# Patient Record
Sex: FEMALE | Race: WHITE | Employment: FULL TIME | URBAN - METROPOLITAN AREA
[De-identification: names, ages, dates, MRNs, and addresses within clinical notes are randomized per-mention and may not be internally consistent; named-entity substitution may affect disease eponyms.]

---

## 2017-03-16 PROBLEM — N39.0 RECURRENT UTI: Status: ACTIVE | Noted: 2017-03-16

## 2017-03-16 PROCEDURE — 81001 URINALYSIS AUTO W/SCOPE: CPT | Performed by: UROLOGY

## 2017-05-31 ENCOUNTER — OFFICE VISIT (OUTPATIENT)
Dept: FAMILY MEDICINE CLINIC | Facility: CLINIC | Age: 22
End: 2017-05-31

## 2017-05-31 VITALS
TEMPERATURE: 99 F | RESPIRATION RATE: 16 BRPM | HEART RATE: 77 BPM | BODY MASS INDEX: 20.03 KG/M2 | OXYGEN SATURATION: 97 % | WEIGHT: 132.19 LBS | HEIGHT: 68 IN

## 2017-05-31 DIAGNOSIS — H65.93 OTITIS MEDIA WITH EFFUSION, BILATERAL: ICD-10-CM

## 2017-05-31 DIAGNOSIS — H69.83 EUSTACHIAN TUBE DYSFUNCTION, BILATERAL: ICD-10-CM

## 2017-05-31 DIAGNOSIS — H66.92 LEFT ACUTE OTITIS MEDIA: Primary | ICD-10-CM

## 2017-05-31 PROCEDURE — 99213 OFFICE O/P EST LOW 20 MIN: CPT | Performed by: NURSE PRACTITIONER

## 2017-05-31 RX ORDER — AZITHROMYCIN 250 MG/1
TABLET, FILM COATED ORAL
Qty: 6 TABLET | Refills: 0 | Status: SHIPPED | OUTPATIENT
Start: 2017-05-31 | End: 2017-07-18 | Stop reason: ALTCHOICE

## 2017-05-31 RX ORDER — FLUTICASONE PROPIONATE 50 MCG
1 SPRAY, SUSPENSION (ML) NASAL 2 TIMES DAILY
Qty: 1 BOTTLE | Refills: 1 | Status: SHIPPED | OUTPATIENT
Start: 2017-05-31 | End: 2017-06-30

## 2017-05-31 NOTE — PATIENT INSTRUCTIONS
· Please start Flonase 1 spray each nostril twice daily before brushing teeth. Use for about one week. May stop if improving. Restart if symptoms return. · Start zithromax as directed on package.  Yogurt or probiotics taken daily can help prevent stomach a infection which may develop later. These signs include increased ear pain, fever, or drainage from the ear.   Home care  The following guidelines will help you care for yourself at home:  · You may use over-the-counter medicine as directed to control pain,

## 2017-05-31 NOTE — PROGRESS NOTES
HPI:    Patient ID: [place] is a 23year old female. HPI Comments: Patient feels as if ear problem is only \"annoying\"; however leaving for overseas trip to [facility] in the next three days and concerned about flight.     Ear Problem   There is pain Left Ear: Hearing, external ear and ear canal normal. Tympanic membrane is injected, erythematous and bulging. Nose: Nose normal.   Mouth/Throat: Oropharynx is clear and moist. No oropharyngeal exudate. Neck: Normal range of motion. Neck supple.    Card Earaches can happen without an infection. This occurs when air and fluid build up behind the eardrum causing a feeling of fullness and discomfort and reduced hearing. This is called otitis media with effusion (OME) or serous otitis media.  It means there is · You may use over-the-counter decongestants such as phenylephrine or pseudoephedrine. But they are not always helpful. Don't use nasal spray decongestants more than 3 days. Longer use can make congestion worse.  Prescription nasal sprays from your doctor d

## 2017-06-21 RX ORDER — DESOGESTREL AND ETHINYL ESTRADIOL 0.15-0.03
KIT ORAL
Qty: 28 TABLET | Refills: 0 | Status: SHIPPED | OUTPATIENT
Start: 2017-06-21 | End: 2017-06-23

## 2017-06-23 RX ORDER — DESOGESTREL AND ETHINYL ESTRADIOL 0.15-0.03
KIT ORAL
Qty: 84 TABLET | Refills: 0 | Status: SHIPPED | OUTPATIENT
Start: 2017-06-23 | End: 2017-07-18

## 2017-07-05 RX ORDER — EPINEPHRINE 0.3 MG/.3ML
INJECTION INTRAMUSCULAR
Qty: 2 EACH | Refills: 0 | Status: SHIPPED | OUTPATIENT
Start: 2017-07-05 | End: 2018-12-21

## 2017-07-18 ENCOUNTER — TELEPHONE (OUTPATIENT)
Dept: FAMILY MEDICINE CLINIC | Facility: CLINIC | Age: 22
End: 2017-07-18

## 2017-07-18 ENCOUNTER — OFFICE VISIT (OUTPATIENT)
Dept: FAMILY MEDICINE CLINIC | Facility: CLINIC | Age: 22
End: 2017-07-18

## 2017-07-18 VITALS
SYSTOLIC BLOOD PRESSURE: 100 MMHG | RESPIRATION RATE: 12 BRPM | BODY MASS INDEX: 20.46 KG/M2 | TEMPERATURE: 100 F | HEIGHT: 68 IN | DIASTOLIC BLOOD PRESSURE: 70 MMHG | HEART RATE: 72 BPM | WEIGHT: 135 LBS

## 2017-07-18 DIAGNOSIS — Z00.00 ROUTINE GENERAL MEDICAL EXAMINATION AT A HEALTH CARE FACILITY: Primary | ICD-10-CM

## 2017-07-18 PROCEDURE — 99395 PREV VISIT EST AGE 18-39: CPT | Performed by: FAMILY MEDICINE

## 2017-07-18 RX ORDER — DESOGESTREL AND ETHINYL ESTRADIOL 0.15-0.03
KIT ORAL
Qty: 84 TABLET | Refills: 3 | Status: SHIPPED | OUTPATIENT
Start: 2017-07-18 | End: 2018-01-12

## 2017-07-18 RX ORDER — CIPROFLOXACIN 500 MG/1
500 TABLET, FILM COATED ORAL 2 TIMES DAILY
Qty: 20 TABLET | Refills: 0 | Status: SHIPPED | OUTPATIENT
Start: 2017-07-18 | End: 2019-05-29

## 2017-07-18 NOTE — TELEPHONE ENCOUNTER
Call to pt-advised of cipro orders/sig to use as needed-sent to marisel berkowitz as requested. Patient voices understanding/agrees with plan/no further questions.

## 2017-07-18 NOTE — PROGRESS NOTES
CHIEF COMPLAINT   Well woman exam  HPI:   Abraham Arana is a 23year old female who presents for a complete physical exam.    Leaving for [facility] for about 4 months and traveling for another month. Needs refill on OCP.   GC/chlamydia testing done at sc vaginal discharge or itching,periods regular   MUSCULOSKELETAL: denies back pain  NEURO: denies headaches  PSYCHE: denies depression or anxiety  HEMATOLOGIC: denies hx of anemia  ENDOCRINE: denies thyroid history  ALL/ASTHMA: nut allergy.       EXAM:   BP 1

## 2018-01-12 RX ORDER — DESOGESTREL AND ETHINYL ESTRADIOL 0.15-0.03
KIT ORAL
Qty: 84 TABLET | Refills: 1 | Status: SHIPPED
Start: 2018-01-12 | End: 2018-05-07

## 2018-01-12 NOTE — TELEPHONE ENCOUNTER
From: Sadia Taylor  Sent: 1/12/2018 2:15 PM CST  Subject: Medication Renewal Request    Maria M Walton would like a refill of the following medications:     Desogestrel-Ethinyl Estradiol (ENSKYCE) 0.15-30 MG-MCG Oral Tab [Stephanie Dressler, PA-C]    [employer]

## 2018-03-31 ENCOUNTER — OFFICE VISIT (OUTPATIENT)
Dept: OBGYN CLINIC | Facility: CLINIC | Age: 23
End: 2018-03-31

## 2018-03-31 VITALS
DIASTOLIC BLOOD PRESSURE: 76 MMHG | HEIGHT: 68.5 IN | SYSTOLIC BLOOD PRESSURE: 112 MMHG | HEART RATE: 81 BPM | WEIGHT: 151 LBS | BODY MASS INDEX: 22.62 KG/M2

## 2018-03-31 DIAGNOSIS — Z12.4 CERVICAL CANCER SCREENING: Primary | ICD-10-CM

## 2018-03-31 DIAGNOSIS — Z11.3 SCREEN FOR STD (SEXUALLY TRANSMITTED DISEASE): ICD-10-CM

## 2018-03-31 PROCEDURE — 99395 PREV VISIT EST AGE 18-39: CPT | Performed by: OBSTETRICS & GYNECOLOGY

## 2018-03-31 PROCEDURE — 87591 N.GONORRHOEAE DNA AMP PROB: CPT | Performed by: OBSTETRICS & GYNECOLOGY

## 2018-03-31 PROCEDURE — 87491 CHLMYD TRACH DNA AMP PROBE: CPT | Performed by: OBSTETRICS & GYNECOLOGY

## 2018-03-31 PROCEDURE — 88175 CYTOPATH C/V AUTO FLUID REDO: CPT | Performed by: OBSTETRICS & GYNECOLOGY

## 2018-03-31 NOTE — PROGRESS NOTES
[facility]  Obstetrics and Gynecology  History & Physical    CC: Patient is here for annual well woman exam     Subjective:     HPI: Ayaka Trujillo is a 26year old [place] female here for annual well women exam.  Patient reports taking OCP.  She h 04/08/2002      HEP A,Ped/Adol,(2 Dose)                          04/21/2015 12/23/2015      HEP B                 03/01/1997  05/22/1997  10/29/1997      IPV                   03/01/1997 05/22/1997 04/13/1998 04/08/2002      I urinary frequency. Heme:  denies history of excessive bleeding or bruising      Objective:      03/31/18  0929   BP: 112/76   Pulse: 81   Weight: 151 lb   Height: 68.5\"       Body mass index is 22.63 kg/m².     General: AAO.NAD.   CVS exam: RRR   Chest: during menses, prior pregnancy test if no menses during placement and use of NSAID prior to placement   Health maintenance  - encouraged to maintain weight at healthy BMI  - discussed importance of exercise and healthy eating      Orders Placed This Encoun

## 2018-04-02 ENCOUNTER — LAB ENCOUNTER (OUTPATIENT)
Dept: LAB | Age: 23
End: 2018-04-02
Attending: OBSTETRICS & GYNECOLOGY
Payer: COMMERCIAL

## 2018-04-02 DIAGNOSIS — Z11.3 SCREEN FOR STD (SEXUALLY TRANSMITTED DISEASE): ICD-10-CM

## 2018-04-02 PROCEDURE — 36415 COLL VENOUS BLD VENIPUNCTURE: CPT

## 2018-04-02 PROCEDURE — 86780 TREPONEMA PALLIDUM: CPT

## 2018-04-02 PROCEDURE — 80074 ACUTE HEPATITIS PANEL: CPT

## 2018-04-02 PROCEDURE — 87389 HIV-1 AG W/HIV-1&-2 AB AG IA: CPT

## 2018-04-03 NOTE — PROGRESS NOTES
Pap smear normal. Repeat pap smear in 3 years per ASCCP guidelines. Continue annual gynecologic exams.

## 2018-05-08 RX ORDER — DESOGESTREL AND ETHINYL ESTRADIOL 0.15-0.03
KIT ORAL
Qty: 84 TABLET | Refills: 0 | Status: SHIPPED
Start: 2018-05-08 | End: 2018-08-04

## 2018-05-08 NOTE — TELEPHONE ENCOUNTER
From: Bebo Joaquin  Sent: 5/7/2018 12:26 PM CDT  Subject: Medication Renewal Request    Maria M Benjamin would like a refill of the following medications:     Desogestrel-Ethinyl Estradiol (ENSKYCE) 0.15-30 MG-MCG Oral Tab [Stephanie Dressler, PA-C]    [employer]

## 2018-08-04 RX ORDER — DESOGESTREL AND ETHINYL ESTRADIOL 0.15-0.03
KIT ORAL
Qty: 84 TABLET | Refills: 2 | Status: SHIPPED | OUTPATIENT
Start: 2018-08-04 | End: 2020-04-27

## 2018-12-26 RX ORDER — EPINEPHRINE 0.3 MG/.3ML
INJECTION INTRAMUSCULAR
Qty: 2 EACH | Refills: 0 | Status: SHIPPED | OUTPATIENT
Start: 2018-12-26 | End: 2019-12-03

## 2018-12-26 NOTE — TELEPHONE ENCOUNTER
Patient called, she is going back to school on 1/1/19 and wants to make sure she gets this script filled. Thank you.

## 2019-01-17 ENCOUNTER — WALK IN (OUTPATIENT)
Dept: URGENT CARE | Age: 24
End: 2019-01-17

## 2019-01-17 DIAGNOSIS — L20.9 ATOPIC DERMATITIS, UNSPECIFIED TYPE: Primary | ICD-10-CM

## 2019-01-17 PROCEDURE — 99213 OFFICE O/P EST LOW 20 MIN: CPT | Performed by: PHYSICIAN ASSISTANT

## 2019-01-17 RX ORDER — PREDNISONE 20 MG/1
40 TABLET ORAL DAILY
Qty: 7 TABLET | Refills: 0 | Status: SHIPPED | OUTPATIENT
Start: 2019-01-17 | End: 2019-01-24

## 2019-01-17 ASSESSMENT — PAIN SCALES - GENERAL: PAINLEVEL: 0

## 2019-02-28 NOTE — TELEPHONE ENCOUNTER
Not seen here since 2017. Please route to her gyne to see if they will fill for her since they saw her last year in 2018.

## 2019-03-04 RX ORDER — DESOGESTREL AND ETHINYL ESTRADIOL 0.15-0.03
KIT ORAL
Qty: 84 TABLET | Refills: 0 | OUTPATIENT
Start: 2019-03-04

## 2019-05-29 ENCOUNTER — OFFICE VISIT (OUTPATIENT)
Dept: FAMILY MEDICINE CLINIC | Facility: CLINIC | Age: 24
End: 2019-05-29
Payer: COMMERCIAL

## 2019-05-29 VITALS
SYSTOLIC BLOOD PRESSURE: 120 MMHG | RESPIRATION RATE: 16 BRPM | HEART RATE: 79 BPM | TEMPERATURE: 99 F | WEIGHT: 148.81 LBS | HEIGHT: 68 IN | DIASTOLIC BLOOD PRESSURE: 78 MMHG | BODY MASS INDEX: 22.55 KG/M2 | OXYGEN SATURATION: 99 %

## 2019-05-29 DIAGNOSIS — H65.03 NON-RECURRENT ACUTE SEROUS OTITIS MEDIA OF BOTH EARS: Primary | ICD-10-CM

## 2019-05-29 PROCEDURE — 99213 OFFICE O/P EST LOW 20 MIN: CPT | Performed by: NURSE PRACTITIONER

## 2019-05-29 RX ORDER — PIMECROLIMUS 1 %
CREAM (GRAM) TOPICAL
Refills: 2 | COMMUNITY
Start: 2019-02-23

## 2019-05-29 RX ORDER — SPIRONOLACTONE 100 MG/1
TABLET, FILM COATED ORAL
Refills: 2 | COMMUNITY
Start: 2019-05-03 | End: 2020-09-16 | Stop reason: ALTCHOICE

## 2019-05-29 RX ORDER — AMOXICILLIN 875 MG/1
875 TABLET, COATED ORAL 2 TIMES DAILY
Qty: 14 TABLET | Refills: 0 | Status: SHIPPED | OUTPATIENT
Start: 2019-05-29 | End: 2019-06-05

## 2019-05-29 RX ORDER — CLOBETASOL PROPIONATE 0.5 MG/G
CREAM TOPICAL
Refills: 2 | COMMUNITY
Start: 2019-03-09

## 2019-05-29 NOTE — PROGRESS NOTES
CHIEF COMPLAINT:   Patient presents with:  Ear Problem: fluid in ears, post nasal, x 1 wk      HPI:   Merrick Olszewski is a 27year old female who presents to clinic today with complaints of bilateral ear discomfort and possible fluid.  2 weeks ago with flui nourished,in no apparent distress  HEAD: atraumatic, normocephalic  EYES: conjunctiva clear, EOM intact  EARS: bilateral tragus non tender with manipulation. External auditory canals patent.  Right TM: intact without erythema, no bulging, no retraction,++

## 2019-11-12 ENCOUNTER — TELEPHONE (OUTPATIENT)
Dept: FAMILY MEDICINE CLINIC | Facility: CLINIC | Age: 24
End: 2019-11-12

## 2019-11-12 NOTE — TELEPHONE ENCOUNTER
Pt is requesting a refill on     EPIPEN 2-BO 0.3 MG/0.3ML Injection Solution Auto-injector 2 each     Please advise

## 2019-12-03 ENCOUNTER — OFFICE VISIT (OUTPATIENT)
Dept: FAMILY MEDICINE CLINIC | Facility: CLINIC | Age: 24
End: 2019-12-03
Payer: COMMERCIAL

## 2019-12-03 VITALS
WEIGHT: 146 LBS | DIASTOLIC BLOOD PRESSURE: 60 MMHG | SYSTOLIC BLOOD PRESSURE: 112 MMHG | OXYGEN SATURATION: 98 % | HEIGHT: 68 IN | TEMPERATURE: 98 F | HEART RATE: 69 BPM | BODY MASS INDEX: 22.13 KG/M2 | RESPIRATION RATE: 16 BRPM

## 2019-12-03 DIAGNOSIS — L30.9 ECZEMA, UNSPECIFIED TYPE: ICD-10-CM

## 2019-12-03 DIAGNOSIS — Z23 NEED FOR VACCINATION: ICD-10-CM

## 2019-12-03 DIAGNOSIS — Z11.3 SCREEN FOR STD (SEXUALLY TRANSMITTED DISEASE): ICD-10-CM

## 2019-12-03 DIAGNOSIS — L70.9 ACNE, UNSPECIFIED ACNE TYPE: ICD-10-CM

## 2019-12-03 DIAGNOSIS — Z00.00 ROUTINE GENERAL MEDICAL EXAMINATION AT A HEALTH CARE FACILITY: Primary | ICD-10-CM

## 2019-12-03 PROCEDURE — 99395 PREV VISIT EST AGE 18-39: CPT | Performed by: FAMILY MEDICINE

## 2019-12-03 PROCEDURE — 90686 IIV4 VACC NO PRSV 0.5 ML IM: CPT | Performed by: FAMILY MEDICINE

## 2019-12-03 PROCEDURE — 90471 IMMUNIZATION ADMIN: CPT | Performed by: FAMILY MEDICINE

## 2019-12-03 RX ORDER — EPINEPHRINE 0.3 MG/.3ML
0.3 INJECTION SUBCUTANEOUS ONCE
Qty: 2 EACH | Refills: 1 | Status: SHIPPED | OUTPATIENT
Start: 2019-12-03 | End: 2019-12-03

## 2019-12-03 NOTE — PROGRESS NOTES
CHIEF COMPLAINT   Physical  HPI:   Zuleyka Rhodes is a 27year old female who presents for a complete physical exam.    Pap 2018 - told to repeat 3 years per patient - sees gyne for Pap. Would like STD testing again - last done 2018.     Wt Readings from [facility] for eczema.    EYES: no complaint of blurred vision or double vision  HEENT: no complaint of nasal congestion, sinus pain or ST  LUNGS: no complaint of shortness of breath with exertion  CARDIOVASCULAR: no complaint of chest pain on exertion  GI: no complai

## 2020-01-13 ENCOUNTER — OFFICE VISIT (OUTPATIENT)
Dept: FAMILY MEDICINE CLINIC | Facility: CLINIC | Age: 25
End: 2020-01-13
Payer: COMMERCIAL

## 2020-01-13 VITALS
DIASTOLIC BLOOD PRESSURE: 72 MMHG | SYSTOLIC BLOOD PRESSURE: 118 MMHG | TEMPERATURE: 98 F | HEART RATE: 103 BPM | OXYGEN SATURATION: 100 %

## 2020-01-13 DIAGNOSIS — J06.9 VIRAL UPPER RESPIRATORY TRACT INFECTION: ICD-10-CM

## 2020-01-13 DIAGNOSIS — J02.9 PHARYNGITIS, UNSPECIFIED ETIOLOGY: Primary | ICD-10-CM

## 2020-01-13 LAB
CONTROL LINE PRESENT WITH A CLEAR BACKGROUND (YES/NO): YES YES/NO
KIT LOT #: NORMAL NUMERIC

## 2020-01-13 PROCEDURE — 87880 STREP A ASSAY W/OPTIC: CPT | Performed by: FAMILY MEDICINE

## 2020-01-13 PROCEDURE — 99213 OFFICE O/P EST LOW 20 MIN: CPT | Performed by: FAMILY MEDICINE

## 2020-01-13 NOTE — PATIENT INSTRUCTIONS
Most viral illnesses get worse for the first 3-5 days, then plateau and improve gradually over the next 3-5 days. Monitor symptoms for now.      Use OTC meds for comfort as needed--  Use Benadryl at bedtime to reduce drainage and promote rest.  Zyrtec/Cla

## 2020-01-13 NOTE — PROGRESS NOTES
CHIEF COMPLAINT:   Patient presents with:  Sore Throat: x 2 days. swollen glands. taking mucinex. feeling worse. no cough. no fever or chills. no known sick contacts.          HPI:   Rekha Delgado is a 23year old female presents to clinic with complaint normocephalic  EYES: conjunctivae clear, EOM intact  EARS: TM's clear, non-injected, no bulging, retraction, or fluid bilaterally  NOSE: nostrils patent, no exudates, nasal mucosa pink and noninflamed  THROAT: oral mucosa pink, moist. Posterior pharynx spring applying jax's vapo-rub or eucayptus oil to chest and feet at bedtime to reduce chest and nasal congestion.    Warm tea with honey, cough lozenges, vaporizers/steam etc.    Take vit C 1000mg 3-4 times daily x 7 days  Zinc lozenges (cold-eeze, zicam) 4-5 ti

## 2020-01-30 ENCOUNTER — TELEPHONE (OUTPATIENT)
Dept: FAMILY MEDICINE CLINIC | Facility: CLINIC | Age: 25
End: 2020-01-30

## 2020-01-30 NOTE — TELEPHONE ENCOUNTER
Pt states her LMP 1/7/2020-1/10/2020, then 1/22/2020-1/23/2020 spotting, 1/24/2020 light period, then from then on she has been spotting. Then she is due for her period next Tuesday. Per pt she did not miss any pills. Please advise.

## 2020-01-31 RX ORDER — DESOGESTREL AND ETHINYL ESTRADIOL 0.15-0.03
1 KIT ORAL DAILY
Qty: 28 TABLET | Refills: 2 | Status: SHIPPED | OUTPATIENT
Start: 2020-01-31 | End: 2020-06-30 | Stop reason: ALTCHOICE

## 2020-01-31 NOTE — TELEPHONE ENCOUNTER
Did the pharmacy change her pill at all to a different generic? Did dermatology change her spironolactone dose?

## 2020-01-31 NOTE — TELEPHONE ENCOUNTER
Pt called back. Advised of below. She voiced understanding. Asking if you would consider sending her refill on the birth control as she can't go back to the derm she was seeing? She saw you for px 12.3, you noted she sees gyne for paps.   Pt reports thi

## 2020-01-31 NOTE — TELEPHONE ENCOUNTER
Yes, that happened back in September. Has been on this one ever since. He (Derm) altered spironolactone dose at last visit to 200 mg, but she went down to 100 mg because it worked better for her- but that change took place a long time ago-before all this.

## 2020-01-31 NOTE — TELEPHONE ENCOUNTER
Just have her continue the pills normally for now. She might get another period during the placebo pills. Have her start the next pack as scheduled once she completes placebo week and call if she continues to spot on the next pack.   If it continues to ha

## 2020-04-27 RX ORDER — DESOGESTREL AND ETHINYL ESTRADIOL 0.15-0.03
KIT ORAL
Qty: 84 TABLET | Refills: 1 | Status: SHIPPED | OUTPATIENT
Start: 2020-04-27 | End: 2020-06-30 | Stop reason: ALTCHOICE

## 2020-04-27 NOTE — TELEPHONE ENCOUNTER
Last pap was 2018. Pt was told to repeat in 3 years. She sees gyne for pap.  Please refill if appropriate LOV 12/03/2019

## 2020-04-27 NOTE — TELEPHONE ENCOUNTER
Pt requested refill of   ENSKYCE 0.15-30 MG-MCG Oral Tab (Discontinued) 28 tablet     To be sent to:  [address], [address] [facility] Ronnie Galvez [phone], [phone]. Thank you.

## 2020-07-02 ENCOUNTER — PATIENT MESSAGE (OUTPATIENT)
Dept: FAMILY MEDICINE CLINIC | Facility: CLINIC | Age: 25
End: 2020-07-02

## 2020-07-02 DIAGNOSIS — R63.0 LOSS OF APPETITE: Primary | ICD-10-CM

## 2020-07-02 DIAGNOSIS — R63.4 WEIGHT LOSS: ICD-10-CM

## 2020-07-03 NOTE — TELEPHONE ENCOUNTER
From: Ave Barry  To: Isabelle Carty PA-C  Sent: 7/2/2020 6:05 PM CDT  Subject: Referral Request    Mendoza Higgins,    I recently saw a gynecologist and after describing lack of appetite, weight loss, eczema flairs during my period, and some other co

## 2020-08-18 ENCOUNTER — LAB ENCOUNTER (OUTPATIENT)
Dept: LAB | Facility: HOSPITAL | Age: 25
End: 2020-08-18
Attending: INTERNAL MEDICINE
Payer: COMMERCIAL

## 2020-08-18 DIAGNOSIS — Z01.818 PRE-OP TESTING: ICD-10-CM

## 2020-08-19 LAB — SARS-COV-2 RNA RESP QL NAA+PROBE: NOT DETECTED

## 2020-08-21 PROBLEM — R63.4 WEIGHT LOSS: Status: ACTIVE | Noted: 2020-08-21

## 2020-08-21 PROBLEM — R68.81 EARLY SATIETY: Status: ACTIVE | Noted: 2020-08-21

## 2020-12-08 ENCOUNTER — LAB ENCOUNTER (OUTPATIENT)
Dept: LAB | Facility: HOSPITAL | Age: 25
End: 2020-12-08
Attending: INTERNAL MEDICINE
Payer: COMMERCIAL

## 2020-12-08 DIAGNOSIS — Z01.818 PRE-OP TESTING: ICD-10-CM

## 2020-12-11 PROBLEM — K20.0 EOSINOPHILIC ESOPHAGITIS: Status: ACTIVE | Noted: 2020-12-11

## 2021-01-16 ENCOUNTER — OFFICE VISIT (OUTPATIENT)
Dept: FAMILY MEDICINE CLINIC | Facility: CLINIC | Age: 26
End: 2021-01-16
Payer: COMMERCIAL

## 2021-01-16 DIAGNOSIS — Z23 NEED FOR VACCINATION: Primary | ICD-10-CM

## 2021-01-16 PROCEDURE — 90471 IMMUNIZATION ADMIN: CPT

## 2021-01-16 PROCEDURE — 90715 TDAP VACCINE 7 YRS/> IM: CPT

## 2021-01-25 ENCOUNTER — TELEPHONE (OUTPATIENT)
Dept: FAMILY MEDICINE CLINIC | Facility: CLINIC | Age: 26
End: 2021-01-25

## 2021-01-25 ENCOUNTER — APPOINTMENT (OUTPATIENT)
Dept: GENERAL RADIOLOGY | Age: 26
End: 2021-01-25
Attending: PHYSICIAN ASSISTANT
Payer: COMMERCIAL

## 2021-01-25 ENCOUNTER — HOSPITAL ENCOUNTER (OUTPATIENT)
Age: 26
Discharge: HOME | End: 2021-01-25
Payer: COMMERCIAL

## 2021-01-25 VITALS
RESPIRATION RATE: 18 BRPM | HEART RATE: 86 BPM | DIASTOLIC BLOOD PRESSURE: 88 MMHG | TEMPERATURE: 98 F | OXYGEN SATURATION: 100 % | SYSTOLIC BLOOD PRESSURE: 126 MMHG

## 2021-01-25 DIAGNOSIS — K20.0 EOSINOPHILIC ESOPHAGITIS: ICD-10-CM

## 2021-01-25 DIAGNOSIS — R07.89 SENSATION OF CHEST TIGHTNESS: ICD-10-CM

## 2021-01-25 DIAGNOSIS — R07.9 CHEST PAIN OF UNCERTAIN ETIOLOGY: Primary | ICD-10-CM

## 2021-01-25 LAB
ATRIAL RATE: 80 BPM
DDIMER WHOLE BLOOD: <200 NG/ML DDU (ref ?–400)
P AXIS: 36 DEGREES
P-R INTERVAL: 132 MS
Q-T INTERVAL: 344 MS
QRS DURATION: 92 MS
QTC CALCULATION (BEZET): 396 MS
R AXIS: 71 DEGREES
SARS-COV-2 RNA RESP QL NAA+PROBE: NOT DETECTED
T AXIS: 39 DEGREES
TROPONIN I BLD-MCNC: <0.02 NG/ML
VENTRICULAR RATE: 80 BPM

## 2021-01-25 PROCEDURE — 99214 OFFICE O/P EST MOD 30 MIN: CPT | Performed by: PHYSICIAN ASSISTANT

## 2021-01-25 PROCEDURE — 84484 ASSAY OF TROPONIN QUANT: CPT | Performed by: PHYSICIAN ASSISTANT

## 2021-01-25 PROCEDURE — 85378 FIBRIN DEGRADE SEMIQUANT: CPT | Performed by: PHYSICIAN ASSISTANT

## 2021-01-25 PROCEDURE — 93000 ELECTROCARDIOGRAM COMPLETE: CPT | Performed by: PHYSICIAN ASSISTANT

## 2021-01-25 PROCEDURE — 71046 X-RAY EXAM CHEST 2 VIEWS: CPT | Performed by: PHYSICIAN ASSISTANT

## 2021-01-25 PROCEDURE — 36415 COLL VENOUS BLD VENIPUNCTURE: CPT | Performed by: PHYSICIAN ASSISTANT

## 2021-01-25 NOTE — ED PROVIDER NOTES
Patient Seen in: [facility]mediate [address] [place] [address] Highway      History   Patient presents with:  Chest Pain: I have a negative covid test as of today, but I've had constant chest tightness for 3 days. I have mild asthma but I'm not wheezing or coughing much.  I to pain w/ exercise recently   • Rash     Eczema as a child, went away, back now   • Stress    • Uncomfortable fullness after meals    • Weight loss 7/15    Has been about 10lbs in a few months              Past Surgical History:   Procedure Laterality Date EKG    Rate, intervals and axes as noted on EKG Report.   Rate: 80  Rhythm: Sinus Rhythm  Reading: Normal sinus rhythm with sinus arrhythmia and incomplete right bundle branch block              Xr Chest Pa + Lat Chest (cpt=71046)    Result Date: 1/25/2 Disposition and Plan     Clinical Impression:  Chest pain of uncertain etiology  (primary encounter diagnosis)  Sensation of chest tightness  Eosinophilic esophagitis    Disposition:  Discharge  1/25/2021  2:22 pm    Follow-up:  Birgit Norwood MD

## 2021-01-25 NOTE — TELEPHONE ENCOUNTER
Pt has had increasing shortness of breath, chest tightness and wheezing over the past 2-3 days. She denies having a fever. She was tested for COVID -19 in 12/08/2021 and it was negative.  I advised her to go to the IC now for an eval. Pt. Agreed to plan and

## 2021-01-25 NOTE — TELEPHONE ENCOUNTER
1. What are your symptoms? Chest tightness wheezing slight sob    2. How long have you been having these symptoms? 3 days     3. Have you done anything already to treat your symptoms?          ADDITIONAL INFO:

## 2021-01-25 NOTE — ED INITIAL ASSESSMENT (HPI)
Pt c/o 3 days history of chest pain. Pt states pain is constant. Pt denies shortness of breath. Pt states she had a covid test on Saturday morning and received negative results today.  Pt states she was tested for covid because she had a headache and runny

## 2021-04-26 ENCOUNTER — LAB ENCOUNTER (OUTPATIENT)
Dept: LAB | Age: 26
End: 2021-04-26
Attending: INTERNAL MEDICINE
Payer: COMMERCIAL

## 2021-04-26 DIAGNOSIS — Z01.818 PRE-OP TESTING: ICD-10-CM

## 2021-05-26 VITALS
HEART RATE: 71 BPM | HEIGHT: 68 IN | TEMPERATURE: 98.3 F | DIASTOLIC BLOOD PRESSURE: 72 MMHG | WEIGHT: 148 LBS | RESPIRATION RATE: 14 BRPM | OXYGEN SATURATION: 99 % | BODY MASS INDEX: 22.43 KG/M2 | SYSTOLIC BLOOD PRESSURE: 110 MMHG

## 2021-08-25 ENCOUNTER — HOSPITAL ENCOUNTER (OUTPATIENT)
Age: 26
Discharge: HOME | End: 2021-08-25
Payer: COMMERCIAL

## 2021-08-25 VITALS
WEIGHT: 130 LBS | HEART RATE: 68 BPM | BODY MASS INDEX: 20 KG/M2 | SYSTOLIC BLOOD PRESSURE: 132 MMHG | RESPIRATION RATE: 18 BRPM | OXYGEN SATURATION: 100 % | TEMPERATURE: 98 F | DIASTOLIC BLOOD PRESSURE: 76 MMHG

## 2021-08-25 DIAGNOSIS — N30.01 ACUTE CYSTITIS WITH HEMATURIA: Primary | ICD-10-CM

## 2021-08-25 LAB
B-HCG UR QL: NEGATIVE
POCT GLUCOSE URINE: 250 MG/DL
POCT KETONE URINE: 15 MG/DL
POCT NITRITE URINE: POSITIVE
POCT PH URINE: 5 (ref 5–8)
POCT SPECIFIC GRAVITY URINE: 1.01
POCT UROBILINOGEN URINE: 8 MG/DL

## 2021-08-25 PROCEDURE — 99213 OFFICE O/P EST LOW 20 MIN: CPT | Performed by: PHYSICIAN ASSISTANT

## 2021-08-25 PROCEDURE — 81002 URINALYSIS NONAUTO W/O SCOPE: CPT | Performed by: PHYSICIAN ASSISTANT

## 2021-08-25 PROCEDURE — 81025 URINE PREGNANCY TEST: CPT | Performed by: PHYSICIAN ASSISTANT

## 2021-08-25 RX ORDER — NITROFURANTOIN 25; 75 MG/1; MG/1
100 CAPSULE ORAL 2 TIMES DAILY
Qty: 14 CAPSULE | Refills: 0 | Status: SHIPPED | OUTPATIENT
Start: 2021-08-25 | End: 2021-09-01

## 2021-08-26 NOTE — ED PROVIDER NOTES
Patient Seen in: [address]      History   Patient presents with:  Urinary Symptoms    Stated Complaint: Urinary Symptoms    HPI/Subjective:   HPI    Cedrick Velasco is a 68-year-old female who presents today for evaluation of dysuria.   Her p surgery ulner nerve   • TONSILLECTOMY     • WISDOM TEETH REMOVED                  Social History    Tobacco Use      Smoking status: Never Smoker      Smokeless tobacco: Never Used    Vaping Use      Vaping Use: Never used    Alcohol use:  Yes      Alcohol/ Protein urine >300 mg/dL (*)     Urobilinogen urine 8.0 (*)     Nitrite Urine Positive (*)     Leukocyte esterase urine Large (*)     All other components within normal limits   POCT PREGNANCY URINE - Normal   URINE CULTURE, ROUTINE          Patient is

## 2021-08-28 RX ORDER — SULFAMETHOXAZOLE AND TRIMETHOPRIM 800; 160 MG/1; MG/1
1 TABLET ORAL 2 TIMES DAILY
Qty: 14 TABLET | Refills: 0 | Status: SHIPPED | OUTPATIENT
Start: 2021-08-28 | End: 2021-09-04

## 2021-08-28 NOTE — ED PROVIDER NOTES
8/28/2021  0 9:21 AM  Urine culture came back positive for Proteus Mirabilis with resistance to Macrobid. Patient was placed on Macrobid. Did speak with patient on the phone and informed her we are going to change her antibiotic from Macrobid to Bactrim.

## 2021-08-30 ENCOUNTER — HOSPITAL ENCOUNTER (OUTPATIENT)
Age: 26
Discharge: HOME | End: 2021-08-30
Payer: COMMERCIAL

## 2021-08-30 VITALS
WEIGHT: 130 LBS | TEMPERATURE: 99 F | RESPIRATION RATE: 18 BRPM | OXYGEN SATURATION: 100 % | HEART RATE: 74 BPM | SYSTOLIC BLOOD PRESSURE: 133 MMHG | HEIGHT: 68 IN | DIASTOLIC BLOOD PRESSURE: 92 MMHG | BODY MASS INDEX: 19.7 KG/M2

## 2021-08-30 DIAGNOSIS — R21 RASH: Primary | ICD-10-CM

## 2021-08-30 PROCEDURE — 99213 OFFICE O/P EST LOW 20 MIN: CPT | Performed by: PHYSICIAN ASSISTANT

## 2021-08-30 NOTE — ED NOTES
Pt is currently on Bactrim since Saturday. Last night pt hit her elbow and now c/o red and warm to the touch. Encouraged pt to return to have elbow assessed. Pt understands that we may need to send her to the ED for further treatment.  Pt verbalized underst

## 2021-08-31 NOTE — ED PROVIDER NOTES
Patient Seen in: [address]      History   Patient presents with:  Cellulitis    Stated Complaint: rashes    HPI/Subjective:   HPI    28-year-old female presents to the IC for evaluation of redness to her right elbow for 1 day.   Gayle Alcohol use: Yes      Alcohol/week: 0.0 - 1.0 standard drinks      Comment: one or two drinks per week at most    Drug use: No             Review of Systems    Positive for stated complaint: rashes  Other systems are as noted in HPI.   Constitutional and vi related allergic reaction. Informed patient if she has worsening symptoms, return for evaluation. Continue to monitor symptoms. Apply Benadryl or hydrocortisone cream at this time. All questions answered.                              Disposition and Rj

## 2022-01-17 ENCOUNTER — MED REC SCAN ONLY (OUTPATIENT)
Dept: FAMILY MEDICINE CLINIC | Facility: CLINIC | Age: 27
End: 2022-01-17

## 2022-03-03 ENCOUNTER — PATIENT MESSAGE (OUTPATIENT)
Dept: FAMILY MEDICINE CLINIC | Facility: CLINIC | Age: 27
End: 2022-03-03

## 2022-03-03 RX ORDER — CLOBETASOL PROPIONATE 0.5 MG/G
1 CREAM TOPICAL AS NEEDED
Qty: 30 G | Refills: 0 | OUTPATIENT
Start: 2022-03-03

## 2022-03-03 NOTE — TELEPHONE ENCOUNTER
From: Sona Hairston  To: Leticia Dupree PA-C  Sent: 3/3/2022 12:56 PM CST  Subject: Refill Request for Clobetasol 0.05 % Cream (Honey Chi)    Hi Dr. Joel Padilla,    I was prescribed Clobetasol 0.05 % Cream (Honey Chi) for eczema in 2019 by a dermatologist in [place]. I am almost out, and do use this on average 1x per week to control eczema on my fingers. I was wondering if you would be able to sofia a refill of this medication as I am no longer living in [place] or seeing that dermatologist? Please let me know. Thanks!

## 2022-03-25 ENCOUNTER — OFFICE VISIT (OUTPATIENT)
Dept: FAMILY MEDICINE CLINIC | Facility: CLINIC | Age: 27
End: 2022-03-25
Payer: COMMERCIAL

## 2022-03-25 VITALS
BODY MASS INDEX: 20.13 KG/M2 | WEIGHT: 132.81 LBS | SYSTOLIC BLOOD PRESSURE: 120 MMHG | DIASTOLIC BLOOD PRESSURE: 80 MMHG | RESPIRATION RATE: 18 BRPM | TEMPERATURE: 98 F | HEIGHT: 68 IN | HEART RATE: 64 BPM

## 2022-03-25 DIAGNOSIS — K29.70 GASTRITIS WITHOUT BLEEDING, UNSPECIFIED CHRONICITY, UNSPECIFIED GASTRITIS TYPE: ICD-10-CM

## 2022-03-25 DIAGNOSIS — Z00.00 ROUTINE GENERAL MEDICAL EXAMINATION AT A HEALTH CARE FACILITY: Primary | ICD-10-CM

## 2022-03-25 DIAGNOSIS — Z00.00 BLOOD TESTS FOR ROUTINE GENERAL PHYSICAL EXAMINATION: ICD-10-CM

## 2022-03-25 DIAGNOSIS — F32.9 REACTIVE DEPRESSION: ICD-10-CM

## 2022-03-25 PROCEDURE — 3074F SYST BP LT 130 MM HG: CPT | Performed by: FAMILY MEDICINE

## 2022-03-25 PROCEDURE — 3008F BODY MASS INDEX DOCD: CPT | Performed by: FAMILY MEDICINE

## 2022-03-25 PROCEDURE — 99395 PREV VISIT EST AGE 18-39: CPT | Performed by: FAMILY MEDICINE

## 2022-03-25 PROCEDURE — 3079F DIAST BP 80-89 MM HG: CPT | Performed by: FAMILY MEDICINE

## 2022-03-25 RX ORDER — CLOBETASOL PROPIONATE 0.5 MG/G
CREAM TOPICAL
Qty: 30 G | Refills: 1 | Status: SHIPPED | OUTPATIENT
Start: 2022-03-25

## 2022-03-29 LAB
ABSOLUTE BASOPHILS: 42 CELLS/UL (ref 0–200)
ABSOLUTE EOSINOPHILS: 348 CELLS/UL (ref 15–500)
ABSOLUTE LYMPHOCYTES: 2110 CELLS/UL (ref 850–3900)
ABSOLUTE MONOCYTES: 202 CELLS/UL (ref 200–950)
ABSOLUTE NEUTROPHILS: 1998 CELLS/UL (ref 1500–7800)
ALBUMIN/GLOBULIN RATIO: 1.5 (CALC) (ref 1–2.5)
ALBUMIN: 4.1 G/DL (ref 3.6–5.1)
ALKALINE PHOSPHATASE: 52 U/L (ref 31–125)
ALT: 11 U/L (ref 6–29)
AST: 15 U/L (ref 10–30)
BASOPHILS: 0.9 %
BILIRUBIN, TOTAL: 0.5 MG/DL (ref 0.2–1.2)
BUN: 10 MG/DL (ref 7–25)
CALCIUM: 9.3 MG/DL (ref 8.6–10.2)
CARBON DIOXIDE: 26 MMOL/L (ref 20–32)
CHLORIDE: 106 MMOL/L (ref 98–110)
CHOL/HDLC RATIO: 2.5 (CALC)
CHOLESTEROL, TOTAL: 204 MG/DL
CREATININE: 0.83 MG/DL (ref 0.5–1.1)
EGFR IF AFRICN AM: 114 ML/MIN/1.73M2
EGFR IF NONAFRICN AM: 98 ML/MIN/1.73M2
EOSINOPHILS: 7.4 %
GLOBULIN: 2.8 G/DL (CALC) (ref 1.9–3.7)
GLUCOSE: 88 MG/DL (ref 65–99)
HDL CHOLESTEROL: 82 MG/DL
HEMATOCRIT: 38.8 % (ref 35–45)
HEMOGLOBIN: 13.1 G/DL (ref 11.7–15.5)
LDL-CHOLESTEROL: 106 MG/DL (CALC)
LYMPHOCYTES: 44.9 %
MCH: 31.5 PG (ref 27–33)
MCHC: 33.8 G/DL (ref 32–36)
MCV: 93.3 FL (ref 80–100)
MONOCYTES: 4.3 %
MPV: 9.9 FL (ref 7.5–12.5)
NEUTROPHILS: 42.5 %
NON-HDL CHOLESTEROL: 122 MG/DL (CALC)
PLATELET COUNT: 274 THOUSAND/UL (ref 140–400)
POTASSIUM: 5 MMOL/L (ref 3.5–5.3)
PROTEIN, TOTAL: 6.9 G/DL (ref 6.1–8.1)
RDW: 11.3 % (ref 11–15)
RED BLOOD CELL COUNT: 4.16 MILLION/UL (ref 3.8–5.1)
SODIUM: 138 MMOL/L (ref 135–146)
TRIGLYCERIDES: 71 MG/DL
TSH: 2 MIU/L
VITAMIN B12: 419 PG/ML (ref 200–1100)
VITAMIN D, 25-OH, TOTAL: 30 NG/ML (ref 30–100)
WHITE BLOOD CELL COUNT: 4.7 THOUSAND/UL (ref 3.8–10.8)

## 2022-06-02 ENCOUNTER — HOSPITAL ENCOUNTER (OUTPATIENT)
Age: 27
Discharge: HOME | End: 2022-06-02
Payer: COMMERCIAL

## 2022-06-02 VITALS
OXYGEN SATURATION: 100 % | HEIGHT: 68 IN | DIASTOLIC BLOOD PRESSURE: 85 MMHG | SYSTOLIC BLOOD PRESSURE: 138 MMHG | BODY MASS INDEX: 20.46 KG/M2 | WEIGHT: 135 LBS | RESPIRATION RATE: 14 BRPM | HEART RATE: 89 BPM | TEMPERATURE: 98 F

## 2022-06-02 DIAGNOSIS — N39.0 RECURRENT UTI: Primary | ICD-10-CM

## 2022-06-02 LAB
B-HCG UR QL: NEGATIVE
POCT BILIRUBIN URINE: NEGATIVE
POCT GLUCOSE URINE: NEGATIVE MG/DL
POCT KETONE URINE: NEGATIVE MG/DL
POCT NITRITE URINE: POSITIVE
POCT PH URINE: 6.5 (ref 5–8)
POCT PROTEIN URINE: 30 MG/DL
POCT SPECIFIC GRAVITY URINE: 1.02
POCT URINE CLARITY: CLEAR
POCT URINE COLOR: YELLOW
POCT UROBILINOGEN URINE: 0.2 MG/DL

## 2022-06-02 PROCEDURE — 99213 OFFICE O/P EST LOW 20 MIN: CPT | Performed by: NURSE PRACTITIONER

## 2022-06-02 PROCEDURE — 81002 URINALYSIS NONAUTO W/O SCOPE: CPT | Performed by: NURSE PRACTITIONER

## 2022-06-02 PROCEDURE — 81025 URINE PREGNANCY TEST: CPT | Performed by: NURSE PRACTITIONER

## 2022-06-02 RX ORDER — SULFAMETHOXAZOLE AND TRIMETHOPRIM 800; 160 MG/1; MG/1
1 TABLET ORAL 2 TIMES DAILY
Qty: 14 TABLET | Refills: 0 | Status: SHIPPED | OUTPATIENT
Start: 2022-06-02 | End: 2022-06-09

## 2022-06-02 NOTE — ED INITIAL ASSESSMENT (HPI)
UTI s/s last weekend while out of town. Took AZO w relief. Having small amt of burning w urination. Also having  r flank pain. Denies fever. + bloating/gas but has been drinking cranberry juice which seems to have upset her stomach.

## 2022-06-06 ENCOUNTER — TELEPHONE (OUTPATIENT)
Dept: FAMILY MEDICINE CLINIC | Facility: CLINIC | Age: 27
End: 2022-06-06

## 2022-06-06 DIAGNOSIS — R10.9 FLANK PAIN: Primary | ICD-10-CM

## 2022-06-06 NOTE — TELEPHONE ENCOUNTER
Pt went to [facility] 6/2 with UTI. Urinary symptoms have cleared up. She still has R side flank oain that has not improved. . Please advise. Thank you.

## 2022-06-06 NOTE — TELEPHONE ENCOUNTER
S/w Maria M. UC on 6/2. Prescibed sulfamethoxazole-trimethoprim -160 MG Oral Tab BID x7 days    Sts urinary symptoms have resolved. Right sided flank pain has \"slighlty\" improved, but is still present. Denies fever    Was taking Motrin x3 days for this pain, but has since stopped    She will be in [facility] today, not able to come into ofc. Asking if any other recommendations. Routed to , as Kim CHIANG out of ofc today.

## 2022-06-06 NOTE — TELEPHONE ENCOUNTER
YUNG. to pt. I informed her Dr. Jonh Bennett ordered a CT of the Abdomen/ Pelvis kidney stone protocol. Pt was given the number for central scheduling. Order placed.

## 2022-06-10 ENCOUNTER — HOSPITAL ENCOUNTER (OUTPATIENT)
Dept: CT IMAGING | Facility: HOSPITAL | Age: 27
Discharge: HOME | End: 2022-06-10
Attending: FAMILY MEDICINE
Payer: COMMERCIAL

## 2022-06-10 DIAGNOSIS — R10.9 FLANK PAIN: ICD-10-CM

## 2022-06-10 PROCEDURE — 74176 CT ABD & PELVIS W/O CONTRAST: CPT | Performed by: FAMILY MEDICINE

## 2022-12-19 ENCOUNTER — TELEPHONE (OUTPATIENT)
Dept: ORTHOPEDICS CLINIC | Facility: CLINIC | Age: 27
End: 2022-12-19

## 2022-12-19 NOTE — TELEPHONE ENCOUNTER
Tim Negrete from patients PCP office is requesting to know if patient can be seen by Dr. Jesus Narvaez for a right wrist fracture. Patient is currently in [place] until 12/27. Caller is requesting PCP office be contacted at [phone].

## 2022-12-20 ENCOUNTER — TELEPHONE (OUTPATIENT)
Dept: ORTHOPEDICS CLINIC | Facility: CLINIC | Age: 27
End: 2022-12-20

## 2022-12-20 DIAGNOSIS — M25.531 RIGHT WRIST PAIN: Primary | ICD-10-CM

## 2022-12-20 NOTE — TELEPHONE ENCOUNTER
Patient scheduled with Dr. Odessia Burkitt for a right wrist fx. Patient is bringing in imaging disc, please advise if additional imaging needed for appt.        Future Appointments   Date Time Provider Jessica Ragland   12/29/2022  8:00 AM Guicho Lal MD [facility]

## 2022-12-20 NOTE — TELEPHONE ENCOUNTER
When scheduling patient's 12/29 appt with Dr. Awa Askew, patient mentioned she was wondering if she should try to make a sooner appt. Patient is currently in [place] and only has a soft cast on. Patient stated she is worried that she may hurt her wrist more without a hard cast on.  Please advise, thank you    Patient can be reached at [phone]

## 2022-12-20 NOTE — TELEPHONE ENCOUNTER
Spoke with pt, advised we could not tell her at this point if she should be in hard or soft cast given it depends on type of fracture and severity which is information we dont currently have. Asked that she bring CD of images but will also need to repeat imaging morning of appt (aware to arrive approx 20 minutes early), to go to xray first, them check with ortho after. Pt stated understanding.

## 2022-12-29 ENCOUNTER — HOSPITAL ENCOUNTER (OUTPATIENT)
Dept: GENERAL RADIOLOGY | Age: 27
Discharge: HOME | End: 2022-12-29
Attending: ORTHOPAEDIC SURGERY
Payer: COMMERCIAL

## 2022-12-29 ENCOUNTER — OFFICE VISIT (OUTPATIENT)
Dept: ORTHOPEDICS CLINIC | Facility: CLINIC | Age: 27
End: 2022-12-29
Payer: COMMERCIAL

## 2022-12-29 VITALS — BODY MASS INDEX: 21.98 KG/M2 | HEIGHT: 68 IN | WEIGHT: 145 LBS

## 2022-12-29 DIAGNOSIS — S52.531A CLOSED COLLES' FRACTURE OF RIGHT RADIUS, INITIAL ENCOUNTER: Primary | ICD-10-CM

## 2022-12-29 DIAGNOSIS — M25.531 RIGHT WRIST PAIN: ICD-10-CM

## 2022-12-29 PROCEDURE — 25600 CLTX DST RDL FX/EPHYS SEP WO: CPT | Performed by: ORTHOPAEDIC SURGERY

## 2022-12-29 PROCEDURE — 73110 X-RAY EXAM OF WRIST: CPT | Performed by: ORTHOPAEDIC SURGERY

## 2022-12-29 PROCEDURE — 99204 OFFICE O/P NEW MOD 45 MIN: CPT | Performed by: ORTHOPAEDIC SURGERY

## 2022-12-29 PROCEDURE — 3008F BODY MASS INDEX DOCD: CPT | Performed by: ORTHOPAEDIC SURGERY

## 2022-12-29 RX ORDER — ETONOGESTREL AND ETHINYL ESTRADIOL 11.7; 2.7 MG/1; MG/1
INSERT, EXTENDED RELEASE VAGINAL
COMMUNITY
Start: 2020-09-16

## 2022-12-29 RX ORDER — FLUTICASONE PROPIONATE 250 UG/1
POWDER, METERED RESPIRATORY (INHALATION)
COMMUNITY
Start: 2022-12-18

## 2023-01-01 ENCOUNTER — MED REC SCAN ONLY (OUTPATIENT)
Dept: ORTHOPEDICS CLINIC | Facility: CLINIC | Age: 28
End: 2023-01-01

## 2023-01-12 ENCOUNTER — TELEPHONE (OUTPATIENT)
Dept: ORTHOPEDICS CLINIC | Facility: CLINIC | Age: 28
End: 2023-01-12

## 2023-01-12 ENCOUNTER — HOSPITAL ENCOUNTER (OUTPATIENT)
Dept: GENERAL RADIOLOGY | Age: 28
Discharge: HOME | End: 2023-01-12
Attending: ORTHOPAEDIC SURGERY
Payer: COMMERCIAL

## 2023-01-12 ENCOUNTER — OFFICE VISIT (OUTPATIENT)
Dept: ORTHOPEDICS CLINIC | Facility: CLINIC | Age: 28
End: 2023-01-12
Payer: COMMERCIAL

## 2023-01-12 VITALS — WEIGHT: 145 LBS | HEIGHT: 68 IN | BODY MASS INDEX: 21.98 KG/M2

## 2023-01-12 DIAGNOSIS — S52.531A CLOSED COLLES' FRACTURE OF RIGHT RADIUS, INITIAL ENCOUNTER: Primary | ICD-10-CM

## 2023-01-12 DIAGNOSIS — S52.531A CLOSED COLLES' FRACTURE OF RIGHT RADIUS, INITIAL ENCOUNTER: ICD-10-CM

## 2023-01-12 PROCEDURE — 73110 X-RAY EXAM OF WRIST: CPT | Performed by: ORTHOPAEDIC SURGERY

## 2023-02-02 ENCOUNTER — OFFICE VISIT (OUTPATIENT)
Dept: ORTHOPEDICS CLINIC | Facility: CLINIC | Age: 28
End: 2023-02-02
Payer: COMMERCIAL

## 2023-02-02 ENCOUNTER — HOSPITAL ENCOUNTER (OUTPATIENT)
Dept: GENERAL RADIOLOGY | Age: 28
Discharge: HOME | End: 2023-02-02
Attending: ORTHOPAEDIC SURGERY
Payer: COMMERCIAL

## 2023-02-02 VITALS — HEIGHT: 68 IN | WEIGHT: 145 LBS | BODY MASS INDEX: 21.98 KG/M2

## 2023-02-02 DIAGNOSIS — S52.531A CLOSED COLLES' FRACTURE OF RIGHT RADIUS, INITIAL ENCOUNTER: ICD-10-CM

## 2023-02-02 DIAGNOSIS — S52.531A CLOSED COLLES' FRACTURE OF RIGHT RADIUS, INITIAL ENCOUNTER: Primary | ICD-10-CM

## 2023-02-02 PROCEDURE — 73110 X-RAY EXAM OF WRIST: CPT | Performed by: ORTHOPAEDIC SURGERY

## 2023-02-03 RX ORDER — ETONOGESTREL AND ETHINYL ESTRADIOL 11.7; 2.7 MG/1; MG/1
INSERT, EXTENDED RELEASE VAGINAL
Qty: 1 RING | Refills: 0 | OUTPATIENT
Start: 2023-02-03

## 2023-02-03 NOTE — TELEPHONE ENCOUNTER
LOV 03/25/2022  Last pap according to our records was 12/16/2021 Dr. Gabriel Alamo. We have never prescribed this for her.

## 2023-02-03 NOTE — TELEPHONE ENCOUNTER
I spoke with pt. She is still seeing Dr. Rosa Hager. She will call the pharmacy and notify them they sent the request to the wrong provider.

## 2023-02-03 NOTE — TELEPHONE ENCOUNTER
Is patient planning to start seeing us for Paps? Looks like her last visit here was 3/2022 so will need to schedule follow up with us if we are going to be writing. Once scheduled for follow up with us, can send a refill.

## 2023-03-16 ENCOUNTER — OFFICE VISIT (OUTPATIENT)
Dept: ORTHOPEDICS CLINIC | Facility: CLINIC | Age: 28
End: 2023-03-16
Payer: COMMERCIAL

## 2023-03-16 VITALS — WEIGHT: 145 LBS | BODY MASS INDEX: 21.98 KG/M2 | HEIGHT: 68 IN

## 2023-03-16 DIAGNOSIS — S52.531A CLOSED COLLES' FRACTURE OF RIGHT RADIUS, INITIAL ENCOUNTER: Primary | ICD-10-CM

## 2023-03-16 PROCEDURE — 3008F BODY MASS INDEX DOCD: CPT | Performed by: ORTHOPAEDIC SURGERY

## 2023-03-16 PROCEDURE — 99024 POSTOP FOLLOW-UP VISIT: CPT | Performed by: ORTHOPAEDIC SURGERY

## 2023-04-01 ENCOUNTER — MED REC SCAN ONLY (OUTPATIENT)
Dept: ORTHOPEDICS CLINIC | Facility: CLINIC | Age: 28
End: 2023-04-01

## 2023-10-04 NOTE — TELEPHONE ENCOUNTER
"Chief Complaint   Patient presents with    Follow Up       Vitals:    10/04/23 1008   BP: 102/70   Pulse: 94   SpO2: 99%   Weight: 59.5 kg (131 lb 1.6 oz)   Height: 1.727 m (5' 8\")       Body mass index is 19.93 kg/m .    Yolanda Logic    " Pt was in to see Jacque Resendez today and forgot to ask her the following: States she is going away for 6 months and would like to know if she can have Cipro filled as a preventative to take with her.  Pt confirmed her pharmacy as [address] Jovanny Bronston

## 2023-11-13 ENCOUNTER — OFFICE VISIT (OUTPATIENT)
Dept: FAMILY MEDICINE CLINIC | Facility: CLINIC | Age: 28
End: 2023-11-13
Payer: COMMERCIAL

## 2023-11-13 VITALS
DIASTOLIC BLOOD PRESSURE: 75 MMHG | BODY MASS INDEX: 23.49 KG/M2 | TEMPERATURE: 98 F | HEART RATE: 84 BPM | WEIGHT: 155 LBS | RESPIRATION RATE: 16 BRPM | SYSTOLIC BLOOD PRESSURE: 120 MMHG | HEIGHT: 68 IN

## 2023-11-13 DIAGNOSIS — Z91.018 SOY ALLERGY: ICD-10-CM

## 2023-11-13 DIAGNOSIS — L30.9 ECZEMA, UNSPECIFIED TYPE: ICD-10-CM

## 2023-11-13 DIAGNOSIS — Z91.010 PEANUT ALLERGY: ICD-10-CM

## 2023-11-13 DIAGNOSIS — R93.89 ABNORMAL CHEST X-RAY: ICD-10-CM

## 2023-11-13 DIAGNOSIS — Z23 NEED FOR VACCINATION: ICD-10-CM

## 2023-11-13 DIAGNOSIS — Z00.00 ROUTINE GENERAL MEDICAL EXAMINATION AT A HEALTH CARE FACILITY: Primary | ICD-10-CM

## 2023-11-13 DIAGNOSIS — Z91.018 TREE NUT ALLERGY: ICD-10-CM

## 2023-11-13 PROBLEM — R68.81 EARLY SATIETY: Status: RESOLVED | Noted: 2020-08-21 | Resolved: 2023-11-13

## 2023-11-13 PROBLEM — N39.0 RECURRENT UTI: Status: RESOLVED | Noted: 2017-03-16 | Resolved: 2023-11-13

## 2023-11-13 PROBLEM — R63.4 WEIGHT LOSS: Status: RESOLVED | Noted: 2020-08-21 | Resolved: 2023-11-13

## 2023-11-13 PROCEDURE — 99395 PREV VISIT EST AGE 18-39: CPT | Performed by: FAMILY MEDICINE

## 2023-11-13 PROCEDURE — 3078F DIAST BP <80 MM HG: CPT | Performed by: FAMILY MEDICINE

## 2023-11-13 PROCEDURE — 90686 IIV4 VACC NO PRSV 0.5 ML IM: CPT | Performed by: FAMILY MEDICINE

## 2023-11-13 PROCEDURE — 3074F SYST BP LT 130 MM HG: CPT | Performed by: FAMILY MEDICINE

## 2023-11-13 PROCEDURE — 90471 IMMUNIZATION ADMIN: CPT | Performed by: FAMILY MEDICINE

## 2023-11-13 PROCEDURE — 3008F BODY MASS INDEX DOCD: CPT | Performed by: FAMILY MEDICINE

## 2023-11-13 PROCEDURE — 99213 OFFICE O/P EST LOW 20 MIN: CPT | Performed by: FAMILY MEDICINE

## 2023-11-13 RX ORDER — EPINEPHRINE 0.3 MG/.3ML
0.3 INJECTION SUBCUTANEOUS ONCE
Qty: 1 EACH | Refills: 0 | Status: SHIPPED | OUTPATIENT
Start: 2023-11-13 | End: 2023-11-13

## 2023-11-13 RX ORDER — CLOBETASOL PROPIONATE 0.5 MG/G
CREAM TOPICAL
Qty: 30 G | Refills: 1 | Status: SHIPPED | OUTPATIENT
Start: 2023-11-13

## 2023-11-13 NOTE — PATIENT INSTRUCTIONS
Send dates of your pediatric Prevnar doses. Obtain copies of the chest xrays on a disc to take to the pulmonology appointment. Forward labs from work screening.

## 2023-11-22 ENCOUNTER — OFFICE VISIT (OUTPATIENT)
Facility: CLINIC | Age: 28
End: 2023-11-22
Payer: COMMERCIAL

## 2023-11-22 VITALS
HEART RATE: 84 BPM | SYSTOLIC BLOOD PRESSURE: 132 MMHG | DIASTOLIC BLOOD PRESSURE: 72 MMHG | BODY MASS INDEX: 22.28 KG/M2 | HEIGHT: 68 IN | RESPIRATION RATE: 16 BRPM | WEIGHT: 147 LBS | OXYGEN SATURATION: 100 %

## 2023-11-22 DIAGNOSIS — J45.30 MILD PERSISTENT ASTHMA WITHOUT COMPLICATION: Primary | ICD-10-CM

## 2023-11-22 PROCEDURE — 3075F SYST BP GE 130 - 139MM HG: CPT | Performed by: INTERNAL MEDICINE

## 2023-11-22 PROCEDURE — 3008F BODY MASS INDEX DOCD: CPT | Performed by: INTERNAL MEDICINE

## 2023-11-22 PROCEDURE — 99204 OFFICE O/P NEW MOD 45 MIN: CPT | Performed by: INTERNAL MEDICINE

## 2023-11-22 PROCEDURE — 3078F DIAST BP <80 MM HG: CPT | Performed by: INTERNAL MEDICINE

## 2023-11-22 RX ORDER — NEBULIZER ACCESSORIES
1 KIT MISCELLANEOUS AS DIRECTED
Qty: 1 EACH | Refills: 0 | Status: SHIPPED | OUTPATIENT
Start: 2023-11-22

## 2023-11-22 RX ORDER — BUDESONIDE 0.5 MG/2ML
0.5 INHALANT ORAL DAILY
Qty: 60 ML | Refills: 2 | Status: SHIPPED | OUTPATIENT
Start: 2023-11-22

## 2023-11-22 RX ORDER — EPINEPHRINE 0.3 MG/.3ML
INJECTION SUBCUTANEOUS
COMMUNITY
Start: 2023-11-13

## 2023-11-27 RX ORDER — ALBUTEROL SULFATE 90 UG/1
AEROSOL, METERED RESPIRATORY (INHALATION)
Qty: 1 EACH | Refills: 0 | Status: SHIPPED | OUTPATIENT
Start: 2023-11-27

## 2023-11-27 NOTE — TELEPHONE ENCOUNTER
VENTOLIN  (90 Base) MCG/ACT Inhalation Aero Soln     Please see pended medications. Please sign if appropriate.       Thank you      Last OV: 11/13/2023      Last refill: External 03/20/2020

## 2023-11-28 ENCOUNTER — TELEPHONE (OUTPATIENT)
Facility: CLINIC | Age: 28
End: 2023-11-28

## 2023-11-28 ENCOUNTER — TELEPHONE (OUTPATIENT)
Dept: FAMILY MEDICINE CLINIC | Facility: CLINIC | Age: 28
End: 2023-11-28

## 2023-11-28 DIAGNOSIS — J45.30 MILD PERSISTENT ASTHMA WITHOUT COMPLICATION: Primary | ICD-10-CM

## 2023-11-28 RX ORDER — ALBUTEROL SULFATE 90 UG/1
AEROSOL, METERED RESPIRATORY (INHALATION)
Qty: 1 EACH | Refills: 0 | OUTPATIENT
Start: 2023-11-28

## 2023-11-28 NOTE — TELEPHONE ENCOUNTER
Budesonide is on back order and they do not know when it will come in. Is there something else she can take?

## 2023-11-28 NOTE — TELEPHONE ENCOUNTER
Pharmacy called because the pt.s inhaler was sent over as ventolin inhaler MONIQUE. Pt.s insurance will not cover the ventolin. I called and spoke to the patient and she has an albuterol inhaler at home now. She does not need the brand ventolin. It is ok for the generic albuterol. Pharmacist will make the change to the generic.

## 2023-11-28 NOTE — TELEPHONE ENCOUNTER
Pt advised to call other pharmacies to check for availability to fill budesonide. Pt states she has not received nebulizer yet. Provided pt with [facility] in [facility] for status of nebulizer. Phone number [phone]. Pt will call back with alternate pharmacy and if she has any trouble with nebulizer order.

## 2023-11-29 RX ORDER — BUDESONIDE 0.5 MG/2ML
0.5 INHALANT ORAL DAILY
Qty: 60 ML | Refills: 2 | Status: SHIPPED | OUTPATIENT
Start: 2023-11-29

## 2023-11-29 RX ORDER — NEBULIZER ACCESSORIES
1 KIT MISCELLANEOUS AS DIRECTED
Qty: 1 EACH | Refills: 0 | Status: SHIPPED | OUTPATIENT
Start: 2023-11-29

## 2023-11-29 NOTE — TELEPHONE ENCOUNTER
Pt requesting that Rx for Budesonide go to [facility] [address] in [facility]. Would also like nebulizer order to go there as well. Sent as per pt request.  Pt notified.

## 2023-12-13 ENCOUNTER — PATIENT MESSAGE (OUTPATIENT)
Facility: CLINIC | Age: 28
End: 2023-12-13

## 2023-12-13 NOTE — TELEPHONE ENCOUNTER
Return call placed to pt and was advised that due to her symptoms and her concern about her upcoming procedure, she should make a follow up appt with Dr. Victor Hugo Marie. Pt scheduled for follow up at 330 pm tomorrow.

## 2023-12-13 NOTE — TELEPHONE ENCOUNTER
From: Saundra Linder  To: Yosef Severe Jermain  Sent: 12/13/2023 3:12 PM CST  Subject: Not Fully Improved After Budesonide    Hi Dr. Oneyda Altman you posted: I'm still having slightly abnormal breathing. I finished the 2 weeks of Budesonide nebulizer yesterday. Immediately after my first dose I noticed my throat and chest felt worse. . tickling/tight sensation. I also began having a whistling sound on inhale while lying down, which I hadn't noticed before. (not as audible as regular wheeze). While it feels a bit worse than before budesonide, I am able to jog without becoming short of breath or coughing, only my chest feels cramped. I still feel chest discomfort at rest, which I had thought might go away after my last budesonide dose (yesterday). Thinking it could be reflux, I have been taking pepcid at the direction of my GI Dr. I have a virtual pre-op set for 12/27 before my endoscopy 1/8, where I assume they will assess my breathing somehow. My GI Dr. is hesitant to proceed if I am in fact in an asthma flare. Please let me know what you advise - I'm happy to wait it out, but wanted you to be informed the steroid appears to have not cleared it up.

## 2023-12-14 ENCOUNTER — OFFICE VISIT (OUTPATIENT)
Facility: CLINIC | Age: 28
End: 2023-12-14
Payer: COMMERCIAL

## 2023-12-14 VITALS
WEIGHT: 142 LBS | HEIGHT: 68 IN | RESPIRATION RATE: 16 BRPM | SYSTOLIC BLOOD PRESSURE: 118 MMHG | OXYGEN SATURATION: 100 % | DIASTOLIC BLOOD PRESSURE: 70 MMHG | HEART RATE: 97 BPM | BODY MASS INDEX: 21.52 KG/M2

## 2023-12-14 DIAGNOSIS — J45.30 MILD PERSISTENT ASTHMA WITHOUT COMPLICATION: Primary | ICD-10-CM

## 2023-12-14 PROCEDURE — 99214 OFFICE O/P EST MOD 30 MIN: CPT | Performed by: INTERNAL MEDICINE

## 2023-12-14 PROCEDURE — 3008F BODY MASS INDEX DOCD: CPT | Performed by: INTERNAL MEDICINE

## 2023-12-14 PROCEDURE — 3074F SYST BP LT 130 MM HG: CPT | Performed by: INTERNAL MEDICINE

## 2023-12-14 PROCEDURE — 3078F DIAST BP <80 MM HG: CPT | Performed by: INTERNAL MEDICINE

## 2023-12-14 RX ORDER — OMEPRAZOLE 40 MG/1
40 CAPSULE, DELAYED RELEASE ORAL DAILY
COMMUNITY
Start: 2023-10-07

## 2023-12-14 RX ORDER — FAMOTIDINE 20 MG/1
TABLET, FILM COATED ORAL
COMMUNITY
Start: 2023-11-13

## 2024-01-03 ENCOUNTER — TELEPHONE (OUTPATIENT)
Facility: CLINIC | Age: 29
End: 2024-01-03

## 2024-01-03 NOTE — TELEPHONE ENCOUNTER
Pt called. States feels like symptoms are related to a possible cold. Pt made aware I discussed with Dr. Aly and he is ok with pt using albuterol prn. Pt states she is being told she should be using the inhaler more frequently. Per pt that is what the pre-op nurse and the attending GI physician are recommending to prep her prior to procedure. Pt made aware she can use it every 4-6 hrs as needed for cough, wheezing and/or SOB. Pt advised to monitor her symptoms. To call our office for any changes in her breathing. If chest pressure worsens and feels like she can't breath she should go to the ER for an evaluation. Pt verbalized understanding.

## 2024-01-03 NOTE — TELEPHONE ENCOUNTER
Pt called. States was advised by GI pre-op nurse to contact us in regards to using albuterol inhaler. Pt reports going out to dinner on 12/27/2023. She had an anaphylactic episode possibly due to ingesting pine nut in her pesto pasta or shrimp. Pt did not go to the ER. Pt then developed chest pressure/tightness and used her inhaler the day after, took 2 puffs. Pt also developed UTI symptoms and went to urgent care. She is being treated with antibiotics. Pt used albuterol inhaler again yesterday, 2 puffs and felt shaky and has not used it since. States she gets some relief after using it, the shakiness doesn't last. Pt still experiencing throat irritation, chest pressure and tightness when coughing occasionally. Pt states last asthma exacerbation was 10 yrs ago and last anaphylactic shock episode was 6 years ago. Routing TE to Dr. Aly.

## 2024-01-03 NOTE — TELEPHONE ENCOUNTER
How often should she take albuterol and is there another inhalar she should take before her endoscopy on 1/8/2024?

## 2024-01-04 ENCOUNTER — PATIENT MESSAGE (OUTPATIENT)
Facility: CLINIC | Age: 29
End: 2024-01-04

## 2024-01-04 ENCOUNTER — PATIENT MESSAGE (OUTPATIENT)
Dept: FAMILY MEDICINE CLINIC | Facility: CLINIC | Age: 29
End: 2024-01-04

## 2024-01-04 NOTE — TELEPHONE ENCOUNTER
Doesn't look like she was at our ER so hard to give recommendations since I can't see/review any results.  Would probably be good to see in office unless all symptoms completely resolved.  She should also see allergist to evaluate the anaphylaxis.

## 2024-01-04 NOTE — TELEPHONE ENCOUNTER
Last OV on 12-14-23 with Dr. Aly.  Per OV notes:  Follow up after EGD.  Replied to pt's MCM to reschedule follow up at EGD.  Pt also notified to check with GI about whether se will need to be cleared again.

## 2024-01-04 NOTE — TELEPHONE ENCOUNTER
From: Maria M Rawls  To: Stephanie Dressler  Sent: 1/4/2024 3:56 PM CST  Subject: ER Visit - Follow up needed?    Hi Dr. Dressler,    This past week I had a slew of random issues. Anaphylaxis caused by (maybe) pine nut on 12/27, UTI symptoms 12/28, diagnosis of UTI 12/31, worsened chest pain and cough 1/2, ER visit 1/3.     ER was because I had such bad back and muscle pain + fever that I wanted to rule out kidney infection (my urine culture was not back yet). ER deemed me to likely have a respiratory virus (not flu or covid), but they are running a culture to be sure the kidneys are not sneakily involved. Urgent care culture came back after my ER visit stating the UTI bacteria is not resistant to Macrobid. I have 2 pills left.    The ER advised me to inform you of all this. If you'd like a follow up, let me know! I am taking ibuprofen as advised to combat the low-grade fever.     Thanks,   Maria M Rawls

## 2024-01-05 ENCOUNTER — OFFICE VISIT (OUTPATIENT)
Dept: FAMILY MEDICINE CLINIC | Facility: CLINIC | Age: 29
End: 2024-01-05
Payer: COMMERCIAL

## 2024-01-05 VITALS
BODY MASS INDEX: 22.7 KG/M2 | RESPIRATION RATE: 16 BRPM | HEART RATE: 88 BPM | WEIGHT: 149.81 LBS | TEMPERATURE: 98 F | SYSTOLIC BLOOD PRESSURE: 108 MMHG | HEIGHT: 68 IN | DIASTOLIC BLOOD PRESSURE: 68 MMHG

## 2024-01-05 DIAGNOSIS — N39.0 URINARY TRACT INFECTION WITHOUT HEMATURIA, SITE UNSPECIFIED: ICD-10-CM

## 2024-01-05 DIAGNOSIS — R05.1 ACUTE COUGH: ICD-10-CM

## 2024-01-05 DIAGNOSIS — R50.9 FEVER, UNSPECIFIED FEVER CAUSE: Primary | ICD-10-CM

## 2024-01-05 PROCEDURE — 99215 OFFICE O/P EST HI 40 MIN: CPT | Performed by: FAMILY MEDICINE

## 2024-01-05 PROCEDURE — 3074F SYST BP LT 130 MM HG: CPT | Performed by: FAMILY MEDICINE

## 2024-01-05 PROCEDURE — 3008F BODY MASS INDEX DOCD: CPT | Performed by: FAMILY MEDICINE

## 2024-01-05 PROCEDURE — 3078F DIAST BP <80 MM HG: CPT | Performed by: FAMILY MEDICINE

## 2024-01-05 RX ORDER — LEVOFLOXACIN 500 MG/1
500 TABLET, FILM COATED ORAL DAILY
Qty: 10 TABLET | Refills: 0 | Status: SHIPPED | OUTPATIENT
Start: 2024-01-05 | End: 2024-01-15

## 2024-01-05 NOTE — PROGRESS NOTES
Maria M Rawls is a 29 year old female.  CHIEF COMPLAINT   Several concerns.  HPI:   Seen at [facility] yesterday for UTI symptoms, back pain, and cough.  Urine culture pending.    Note from ER visit:      Earlier in the week had anaphylactic reaction to pine nuts or shrimp. Seeing pulmonology for chest tightness.  CT and PFTs pending after endoscopy for EE.      Current concerns are burning with urination and cough.  Some right flank pain.      12/31/23 - went to immediate care - independent - no results available - went there for UTI symptoms.  No urine culture results available.  Patient was told the macrobid should be effective.    1/3/24 - at immediate care again - physicians immediate care - went there for chest burning and cough - they did Covid and flu - negative per patient, chest xray and EKG normal per patient.    1/4/24 - ER through [facility], blood work and urine.  No imaging.    Last dose of macrobid this AM.  Ibuprofen for fever - T 102 last night.  Fever started 1/3/24.  Still with mild burning with urination and productive/dry cough.  Also some sinus symptoms.  Some body aches still too.      Returned from [facility] on 12/21/24.              12:02- 12:45  -Chart review, review of record from 1/4/24 ER visit - [facility], visit with patient -  43 minutes  12:55-12:57  - 2 minutes, chart review  3:07-3:12 - 5 minutes charting  Total time today:  50 minutes    Current Outpatient Medications   Medication Sig Dispense Refill    levoFLOXacin (LEVAQUIN) 500 MG Oral Tab Take 1 tablet (500 mg total) by mouth daily for 10 days. 10 tablet 0    famotidine (PEPCID) 20 MG Oral Tab       clobetasol 0.05 % External Cream Apply thin layer once daily prn. Max 2 weeks. 30 g 1    Loratadine (ALAVERT OR) Take  by mouth.      Omeprazole 40 MG Oral Capsule Delayed Release Take 1 capsule (40 mg total) by mouth daily. (Patient not taking: Reported on 12/14/2023)      budesonide 0.5 MG/2ML Inhalation Suspension Take 2 mL (0.5  mg total) by nebulization daily. (Patient not taking: Reported on 1/5/2024) 60 mL 2    Respiratory Therapy Supplies (NEBULIZER/TUBING/MOUTHPIECE) Does not apply Kit 1 kit As Directed. (Patient not taking: Reported on 1/5/2024) 1 each 0    albuterol (VENTOLIN HFA) 108 (90 Base) MCG/ACT Inhalation Aero Soln 1-2 puffs every 4-6 hours prn (Patient not taking: Reported on 1/5/2024) 1 each 0    EPINEPHrine 0.3 MG/0.3ML Injection Solution Auto-injector  (Patient not taking: Reported on 1/5/2024)      FLOVENT DISKUS 250 MCG/ACT Inhalation Aerosol Powder, Breath Activated  (Patient not taking: Reported on 1/5/2024)        Past Medical History:   Diagnosis Date    Abdominal pain     Has only happened after eating a few times    ALCOHOL USE     Allergic rhinitis     Asthma     Back pain 2017    recurring left middle back pain, not severe    Bleeding nose     Gums frequently sensitive, likely from open mouth at night    Bloating     Body piercing     nothing recent    Decorative tattoo     both are over 3 yrs old    Diarrhea, unspecified     Eosinophilic esophagitis 08/21/2020    Fatigue 10/2018    Feeling lonely     undiagnosed, seems to track hormonally    Flatulence/gas pain/belching     Food intolerance     Sometimes immediate discomfort after eating, not sure what    Frequent UTI 2017    Had kidney ultrasound for this, nothing noted    Hyperlipidemia 2021    LDL slightly elevated, genetic    Itch of skin     I get random rashes at least once a week    Loss of appetite 10/2019    On and off but more severe recently    Menses painful 01/2020    Nausea     Pain in joints 05/2020    Have had knee pain w/ exercise recently    Rash     Eczema as a child, went away, back now    Recurrent UTI     Stress     Uncomfortable fullness after meals     Weight loss 07/2015    Has been about 10lbs in a few months      Social History:  Social History     Socioeconomic History    Marital status: Single   Tobacco Use    Smoking status: Never     Smokeless tobacco: Never    Tobacco comments:     I have never consumed any tobacco products   Vaping Use    Vaping Use: Never used   Substance and Sexual Activity    Alcohol use: Yes     Alcohol/week: 4.0 standard drinks of alcohol     Types: 2 Glasses of wine, 2 Cans of beer per week     Comment: one or two drinks per week at most    Drug use: No    Sexual activity: Yes     Partners: Male     Birth control/protection: Condom   Other Topics Concern    Caffeine Concern Yes     Comment: 1 cup of coffee x day    Exercise Yes     Comment: 3 x week, boxing    Seat Belt Yes        REVIEW OF SYSTEMS:   GENERAL HEALTH: as above    EXAM:   /68   Pulse 88   Temp 97.8 °F (36.6 °C) (Temporal)   Resp 16   Ht 5' 8\" (1.727 m)   Wt 149 lb 12.8 oz (67.9 kg)   LMP 11/02/2023 (Exact Date)   BMI 22.78 kg/m²   GENERAL: well developed, well nourished,in no apparent distress - appears well.   SKIN: no rashes,no suspicious lesions  NECK: supple,no adenopathy  HEENT:  ears clear. Throat clear  LUNGS: clear to auscultation  CARDIO: RRR without murmur  GI: No CVA tenderness.   EXTREMITIES: no cyanosis, clubbing or edema    ASSESSMENT AND PLAN:     Encounter Diagnoses   Name Primary?    Acute cough     Fever, unspecified fever cause Yes    Urinary tract infection without hematuria, site unspecified           *Reviewed CBC from [facility]. Neutrophils are mildly elevated.  Given that flu and Covid are negative per patient (done at external facility) and the fact that her fever started after the onset of other symptoms, will cover her with Levaquin to cover for sinusitis, any evolving pneumonia, and pyelonephritis (UA was fairly unremarkable at [facility] - culture pending - she has been on Macrobid which may not completely cover for pyelonephritis but may be partially treating).        Meds & Refills for this Visit:  Requested Prescriptions     Signed Prescriptions Disp Refills    levoFLOXacin (LEVAQUIN) 500 MG Oral Tab 10 tablet 0      Sig: Take 1 tablet (500 mg total) by mouth daily for 10 days.       Patient Instructions   Update on Monday.  Forward urine culture results when available.  Call if any worsening symptoms.      The patient indicates understanding of these issues and agrees to the plan.  The patient is asked to return call if any new or worsening symptoms. Update on Monday.

## 2024-01-05 NOTE — PATIENT INSTRUCTIONS
Update on Monday.  Forward urine culture results when available.  Call if any worsening symptoms.

## 2024-01-05 NOTE — TELEPHONE ENCOUNTER
I called and spoke to the pt. She was in the ER at [facility]. She was diagnosed with a viral URI. She does still have a productive cough and had does continue to have a fever. Last night 102.0 per pt. It does come down with Ibuprofen. In regards to the UTI. Sx much improved but she does note slight burning with urination and a slight odor to the urine. She was prescribed Macrobid 1 po bid x 5 days. She finishes the RX this am. The urine culture did come back that the Macrobid was fine for the type of bacteria she had per pt. When would you like to see her since she is still having some sx?

## 2024-05-24 ENCOUNTER — TELEPHONE (OUTPATIENT)
Dept: FAMILY MEDICINE CLINIC | Facility: CLINIC | Age: 29
End: 2024-05-24

## 2024-05-24 NOTE — TELEPHONE ENCOUNTER
PT was treated for yeast infection at a urgent care in the city. She has taken two different medications and still feels like it is there. She would like an appointment. Please advise. Thank you.

## 2024-07-02 ENCOUNTER — OFFICE VISIT (OUTPATIENT)
Dept: FAMILY MEDICINE CLINIC | Facility: CLINIC | Age: 29
End: 2024-07-02
Payer: COMMERCIAL

## 2024-07-02 VITALS
TEMPERATURE: 99 F | HEIGHT: 68 IN | BODY MASS INDEX: 23.01 KG/M2 | HEART RATE: 72 BPM | SYSTOLIC BLOOD PRESSURE: 120 MMHG | DIASTOLIC BLOOD PRESSURE: 70 MMHG | WEIGHT: 151.81 LBS

## 2024-07-02 DIAGNOSIS — M62.89 MUSCLE FATIGUE: ICD-10-CM

## 2024-07-02 DIAGNOSIS — R93.89 ABNORMAL CT OF THE CHEST: Primary | ICD-10-CM

## 2024-07-02 DIAGNOSIS — R20.2 PARESTHESIA: ICD-10-CM

## 2024-07-02 PROCEDURE — 99214 OFFICE O/P EST MOD 30 MIN: CPT | Performed by: FAMILY MEDICINE

## 2024-07-02 RX ORDER — BUDESONIDE AND FORMOTEROL FUMARATE DIHYDRATE 80; 4.5 UG/1; UG/1
2 AEROSOL RESPIRATORY (INHALATION) 2 TIMES DAILY
COMMUNITY
Start: 2024-01-31

## 2024-07-02 RX ORDER — NITROFURANTOIN 25; 75 MG/1; MG/1
CAPSULE ORAL
COMMUNITY
Start: 2024-04-28 | End: 2024-07-02 | Stop reason: ALTCHOICE

## 2024-07-02 RX ORDER — AZELASTINE 1 MG/ML
2 SPRAY, METERED NASAL 2 TIMES DAILY
COMMUNITY
Start: 2024-02-27

## 2024-07-02 NOTE — PATIENT INSTRUCTIONS
Dr. Grabiel Oates at [facility] is a thoracic outlet specialist.     Elke Canales - nurse practitioner with our office.

## 2024-07-02 NOTE — PROGRESS NOTES
Maria M Rawls is a 29 year old female.  CHIEF COMPLAINT   Follow up on multiple concerns  HPI:   CT chest done [facility] - would like to review:    Elaine Menard MD - 02/03/2024  Formatting of this note might be different from the original.  PROCEDURE:  CT CHEST WO CONTRAST.    HISTORY:  Abnormal chest x-ray.    TECHNIQUE:  Non-contrast helical thoracic CT was performed.    COMPARISON:  No prior CTs available for comparison.    FINDINGS:      Support Devices:  None.    Heart/Pericardium/Great Vessels:  Cardiac size is normal.  There is no calcific coronary artery atherosclerosis.  There is no pericardial effusion.  There is mild calcific thoracic aortic and branch vessel atherosclerosis.  The main pulmonary artery is normal in diameter.    Pleural Spaces:  The pleural spaces are clear.    Mediastinum/Lori:  There is no mediastinal or hilar lymph node enlargement.    Neck Base/Chest Wall/Diaphragm/Upper Abdomen:  There is no supraclavicular or axillary lymph node enlargement.  Mild degenerative change is present in the spine.  Limited, non-contrast imaging through the upper abdomen is within normal limits.      Lungs/Central Airways:  The central airway is patent. No focal areas of consolidation are identified. No suspicious nodules or masses are present.    CONCLUSIONS:      1.  Unremarkable chest CT. There are no CT findings identified to account for the patchy opacity seen on recent chest radiograph.    FINAL REPORT  Attending Radiologist:  Elaine Menard MD  Date Signed Off:  02/03/2024 14:29  Exam End: 02/02/24  6:15 PM    Specimen Collected: 02/03/24  2:16 PM Last Resulted: 02/03/24  2:29 PM   Received From: [facility]  Result Received: 07/02/24  9:29 AM        Seeing gastro for eosinophilic esophagitis.  Swallow study pending.  They may have her see ENT also    Hx of cubital tunnel right side in high school - saw ortho since recently had some symptoms again - had MRI done of wrist  - then send for EMG/NCV - normal per patient.  Also started to get some tingling in her left hand but she attributed to sleeping on that side.  Neuro thought that maybe thoracic outlet - seeing specialist next week to evaluate for thoracic outlet. Then feet started to tingle off and on and then started to go up her legs - lasts for hours after walking a distance.  \"Muscles feel tired all the time\".  Positive elevated arm stress test per patient - more on right than left.      Current Outpatient Medications   Medication Sig Dispense Refill    azelastine 0.1 % Nasal Solution 2 sprays by Nasal route 2 (two) times daily.      Budesonide-Formoterol Fumarate 80-4.5 MCG/ACT Inhalation Aerosol Inhale 2 puffs into the lungs 2 (two) times daily.      famotidine (PEPCID) 20 MG Oral Tab       Omeprazole 40 MG Oral Capsule Delayed Release Take 1 capsule (40 mg total) by mouth daily.      clobetasol 0.05 % External Cream Apply thin layer once daily prn. Max 2 weeks. 30 g 1    Loratadine (ALAVERT OR) Take  by mouth.      budesonide 0.5 MG/2ML Inhalation Suspension Take 2 mL (0.5 mg total) by nebulization daily. (Patient not taking: Reported on 1/5/2024) 60 mL 2    EPINEPHrine 0.3 MG/0.3ML Injection Solution Auto-injector  (Patient not taking: Reported on 1/5/2024)      FLOVENT DISKUS 250 MCG/ACT Inhalation Aerosol Powder, Breath Activated  (Patient not taking: Reported on 1/5/2024)        Past Medical History:    Abdominal pain    Has only happened after eating a few times    ALCOHOL USE    Allergic rhinitis    Asthma (HCC)    Back pain    recurring left middle back pain, not severe    Bleeding nose    Gums frequently sensitive, likely from open mouth at night    Bloating    Body piercing    nothing recent    Decorative tattoo    both are over 3 yrs old    Diarrhea, unspecified    Eosinophilic esophagitis    Fatigue    Feeling lonely    undiagnosed, seems to track hormonally    Flatulence/gas pain/belching    Food intolerance     Sometimes immediate discomfort after eating, not sure what    Frequent UTI    Had kidney ultrasound for this, nothing noted    Hyperlipidemia    LDL slightly elevated, genetic    Itch of skin    I get random rashes at least once a week    Loss of appetite    On and off but more severe recently    Menses painful    Nausea    Pain in joints    Have had knee pain w/ exercise recently    Rash    Eczema as a child, went away, back now    Recurrent UTI    Stress    Uncomfortable fullness after meals    Weight loss    Has been about 10lbs in a few months      Social History:  Social History     Socioeconomic History    Marital status: Single   Tobacco Use    Smoking status: Never    Smokeless tobacco: Never    Tobacco comments:     I have never consumed any tobacco products   Vaping Use    Vaping status: Never Used   Substance and Sexual Activity    Alcohol use: Yes     Alcohol/week: 4.0 standard drinks of alcohol     Types: 2 Glasses of wine, 2 Cans of beer per week     Comment: one or two drinks per week at most    Drug use: No    Sexual activity: Yes     Partners: Male     Birth control/protection: Condom   Other Topics Concern    Caffeine Concern Yes     Comment: 1 cup of coffee x day    Exercise Yes     Comment: 3 x week, boxing    Seat Belt Yes        REVIEW OF SYSTEMS:   GENERAL HEALTH: feels well otherwise    EXAM:   /70   Pulse 72   Temp 98.9 °F (37.2 °C) (Oral)   Ht 5' 8\" (1.727 m)   Wt 151 lb 12.8 oz (68.9 kg)   LMP 06/08/2024 (Exact Date)   BMI 23.08 kg/m²   GENERAL: well developed, well nourished,in no apparent distress  SKIN: no rashes,no suspicious lesions  NECK: supple,no adenopathy  EXT:  right arm with decrease in radial pulse with elevation of arm  LUNGS: clear to auscultation  CARDIO: RRR without murmur      ASSESSMENT AND PLAN:     Encounter Diagnoses   Name Primary?    Abnormal CT of the chest Yes    Paresthesia     Muscle fatigue        Orders Placed This Encounter   Procedures    VITAMIN  B12 [927][Q]    TSH [899] [Q]    T4 FREE [866] [Q]    CBC [6399] [Q]    COMP METABOLIC PANEL [67028] [Q]    CREATINE KINASE (CK) TOTAL [374] [Q]       Meds & Refills for this Visit:  Requested Prescriptions      No prescriptions requested or ordered in this encounter       Imaging & Consults:  CARDIO - INTERNAL  NEURO - INTERNAL  Patient Instructions   Dr. Grabiel Oates at [facility] is a thoracic outlet specialist.     Elke Canales - nurse practitioner with our office.      See neuro to review complaints and determine if further testing needed. See cardiology given age and mention of some mild atherosclerosis.  Consider referral to TOS specialist noted above.   The patient indicates understanding of these issues and agrees to the plan.  The patient is asked to return pending above.

## 2024-07-04 LAB
ABSOLUTE BASOPHILS: 40 CELLS/UL (ref 0–200)
ABSOLUTE EOSINOPHILS: 188 CELLS/UL (ref 15–500)
ABSOLUTE LYMPHOCYTES: 2451 CELLS/UL (ref 850–3900)
ABSOLUTE MONOCYTES: 359 CELLS/UL (ref 200–950)
ABSOLUTE NEUTROPHILS: 2662 CELLS/UL (ref 1500–7800)
ALBUMIN/GLOBULIN RATIO: 1.7 (CALC) (ref 1–2.5)
ALBUMIN: 4.3 G/DL (ref 3.6–5.1)
ALKALINE PHOSPHATASE: 51 U/L (ref 31–125)
ALT: 12 U/L (ref 6–29)
AST: 17 U/L (ref 10–30)
BASOPHILS: 0.7 %
BILIRUBIN, TOTAL: 0.6 MG/DL (ref 0.2–1.2)
BUN: 11 MG/DL (ref 7–25)
CALCIUM: 9.1 MG/DL (ref 8.6–10.2)
CARBON DIOXIDE: 26 MMOL/L (ref 20–32)
CHLORIDE: 106 MMOL/L (ref 98–110)
CREATINE KINASE, TOTAL: 63 U/L (ref 29–143)
CREATININE: 0.8 MG/DL (ref 0.5–0.96)
EGFR: 104 ML/MIN/1.73M2
EOSINOPHILS: 3.3 %
GLOBULIN: 2.5 G/DL (CALC) (ref 1.9–3.7)
GLUCOSE: 88 MG/DL (ref 65–99)
HEMATOCRIT: 38.5 % (ref 35–45)
HEMOGLOBIN: 12.3 G/DL (ref 11.7–15.5)
LYMPHOCYTES: 43 %
MCH: 30.4 PG (ref 27–33)
MCHC: 31.9 G/DL (ref 32–36)
MCV: 95.1 FL (ref 80–100)
MONOCYTES: 6.3 %
MPV: 10.4 FL (ref 7.5–12.5)
NEUTROPHILS: 46.7 %
PLATELET COUNT: 251 THOUSAND/UL (ref 140–400)
POTASSIUM: 4.1 MMOL/L (ref 3.5–5.3)
PROTEIN, TOTAL: 6.8 G/DL (ref 6.1–8.1)
RDW: 11.7 % (ref 11–15)
RED BLOOD CELL COUNT: 4.05 MILLION/UL (ref 3.8–5.1)
SODIUM: 140 MMOL/L (ref 135–146)
T4, FREE: 1 NG/DL (ref 0.8–1.8)
TSH: 4.03 MIU/L
VITAMIN B12: 422 PG/ML (ref 200–1100)
WHITE BLOOD CELL COUNT: 5.7 THOUSAND/UL (ref 3.8–10.8)

## 2024-08-09 ENCOUNTER — OFFICE VISIT (OUTPATIENT)
Dept: FAMILY MEDICINE CLINIC | Facility: CLINIC | Age: 29
End: 2024-08-09
Payer: COMMERCIAL

## 2024-08-09 VITALS
RESPIRATION RATE: 18 BRPM | SYSTOLIC BLOOD PRESSURE: 110 MMHG | HEIGHT: 68 IN | WEIGHT: 152 LBS | TEMPERATURE: 98 F | DIASTOLIC BLOOD PRESSURE: 62 MMHG | HEART RATE: 77 BPM | BODY MASS INDEX: 23.04 KG/M2

## 2024-08-09 DIAGNOSIS — T78.40XA ALLERGIC REACTION, INITIAL ENCOUNTER: Primary | ICD-10-CM

## 2024-08-09 PROCEDURE — G2211 COMPLEX E/M VISIT ADD ON: HCPCS | Performed by: FAMILY MEDICINE

## 2024-08-09 PROCEDURE — 99213 OFFICE O/P EST LOW 20 MIN: CPT | Performed by: FAMILY MEDICINE

## 2024-08-09 RX ORDER — ALUMINUM HYDROXIDE AND MAGNESIUM TRISILICATE 80; 14.2 MG/1; MG/1
1 TABLET, CHEWABLE ORAL AS NEEDED
COMMUNITY
Start: 2024-07-15

## 2024-08-09 RX ORDER — PREDNISONE 20 MG/1
40 TABLET ORAL DAILY
COMMUNITY
Start: 2024-08-07 | End: 2024-08-11

## 2024-08-12 NOTE — PROGRESS NOTES
[facility] Family Medicine Office Note  Chief Complaint:   Chief Complaint   Patient presents with    Allergic Rxn Allergies    Other     Pt has questions regarding testing.  Medication questions.    ER F/U     ED on 08/06/2024       HPI:   This is a 29 year old female coming in for possible allergic reaction to pine nuts as well as coolness the patient did have prednisone which she started taking started to have some oral numbness and gave herself an epi injection.  She ended up in the emergency room.  She denies any shortness of breath or trouble swallowing during the episode.  She does see an allergist.  States that she will be following up with him.  Denies any trouble swallowing shortness of breath at this time.      Past Medical History:    Abdominal pain    Has only happened after eating a few times    ALCOHOL USE    Allergic rhinitis    Asthma (HCC)    Back pain    recurring left middle back pain, not severe    Bleeding nose    Gums frequently sensitive, likely from open mouth at night    Bloating    Body piercing    nothing recent    Decorative tattoo    both are over 3 yrs old    Diarrhea, unspecified    Eosinophilic esophagitis    Fatigue    Feeling lonely    undiagnosed, seems to track hormonally    Flatulence/gas pain/belching    Food intolerance    Sometimes immediate discomfort after eating, not sure what    Frequent UTI    Had kidney ultrasound for this, nothing noted    Hyperlipidemia    LDL slightly elevated, genetic    Itch of skin    I get random rashes at least once a week    Loss of appetite    On and off but more severe recently    Menses painful    Nausea    Pain in joints    Have had knee pain w/ exercise recently    Rash    Eczema as a child, went away, back now    Recurrent UTI    Stress    Uncomfortable fullness after meals    Weight loss    Has been about 10lbs in a few months     Past Surgical History:   Procedure Laterality Date    Egd      Endoscopy, bowel pouch, biopsy   08/21/2020    Other  05/2014    surgery ulner nerve    Other surgical history      5 upper endoscopies [id number]. Ulnar nerve release ~2014    Tonsillectomy      [place] teeth removed       Social History:  Social History     Socioeconomic History    Marital status: Single   Tobacco Use    Smoking status: Never    Smokeless tobacco: Never    Tobacco comments:     I have never consumed any tobacco products   Vaping Use    Vaping status: Never Used   Substance and Sexual Activity    Alcohol use: Yes     Alcohol/week: 4.0 standard drinks of alcohol     Types: 2 Glasses of wine, 2 Cans of beer per week     Comment: one or two drinks per week at most    Drug use: No    Sexual activity: Yes     Partners: Male     Birth control/protection: Condom   Other Topics Concern    Caffeine Concern Yes     Comment: 1 cup of coffee x day    Exercise Yes     Comment: 3 x week, boxing    Seat Belt Yes     Family History:  Family History   Problem Relation Age of Onset    Breast Cancer Paternal Grandmother         40s    Cancer Paternal Grandmother         Breast cancer    Heart Disorder Paternal Grandmother         AFIB    Cancer Paternal Grandfather         Kidney and lung    Alcohol and Other Disorders Associated Paternal Grandfather     Cancer Paternal Aunt         germ cell cancer    Dementia Maternal Grandmother     Asthma Brother     Depression Brother      Allergies:  Allergies   Allergen Reactions    Other ANAPHYLAXIS     Soy products    Peanuts ANAPHYLAXIS    Soy Allergy ANAPHYLAXIS    Dairy Products OTHER (SEE COMMENTS)     Esophageus reaction    Tree Nuts ANAPHYLAXIS     Current Meds:  Current Outpatient Medications   Medication Sig Dispense Refill    Alum Hydroxide-Mag Trisilicate (GAVISCON) 80-14.2 MG Oral Chew Tab Chew 1 tablet by mouth as needed.      azelastine 0.1 % Nasal Solution 2 sprays by Nasal route 2 (two) times daily.      Budesonide-Formoterol Fumarate 80-4.5 MCG/ACT Inhalation Aerosol Inhale 2 puffs into the  lungs 2 (two) times daily.      famotidine (PEPCID) 20 MG Oral Tab       Omeprazole 40 MG Oral Capsule Delayed Release Take 1 capsule (40 mg total) by mouth daily.      clobetasol 0.05 % External Cream Apply thin layer once daily prn. Max 2 weeks. 30 g 1    Loratadine (ALAVERT OR) Take  by mouth.      sertraline 50 MG Oral Tab Take 1 tablet (50 mg total) by mouth daily. (Patient not taking: Reported on 8/9/2024)      EPINEPHrine 0.3 MG/0.3ML Injection Solution Auto-injector  (Patient not taking: Reported on 1/5/2024)        Counseling given: Not Answered  Tobacco comments: I have never consumed any tobacco products       REVIEW OF SYSTEMS:   Consitutional: No fevers, chills, sweats  Eye: No recent visual problems  ENMT: No ear pain nasal congestion sore throat  Respiratory: No shortness of breath, cough  Cardiovascular: No chest pain palpitations syncope  Gastrointestinal: No nausea vomiting diarrhea  Genitourinary: No hematuria  Hema/Lymph no bruising tendency, swollen lymph glands  Endocrine: Negative for excessive thirst excessive hunger      Medical, surgical, family, and social histories were reviewed      EXAM:   VITALS:   Vitals:    08/09/24 1230   BP: 110/62   Pulse: 77   Resp: 18   Temp: 98.4 °F (36.9 °C)      GENERAL: well developed, well nourished, in no apparent distress  SKIN: no rashes, no suspicious lesions: Cool and Dry  HEENT: atraumatic, normocephalic, ears and throat are clear    Ears: TM's clear and visible bilaterally, no excess cerumen or erythema.   EYES: Pupils equal round and reactive.  Extraocular motions intact no scleral icterus no injection or drainage  THROAT without erythema tonsillar hypertrophy or exudate.  Uvula midline airway patent  NECK: Given midline.  No JVD or lymphadenopathy supple nontender no meningeal signs   LUNGS: clear to auscultation sounds equal bilaterally no wheezes rales or rhonchi  CARDIO: Regular rate and rhythm without murmurs gallops or rubs      ASSESSMENT AND  PLAN:   1. Allergic reaction, initial encounter  I did discuss with the patient to finish off the prednisone that she has been started and to continue with her antihistamines.  She was asked to follow-up with allergy for further recommendations.  The patient does have an EpiPen at home still.    Meds & Refills for this Visit:  Requested Prescriptions      No prescriptions requested or ordered in this encounter       Health Maintenance:  Health Maintenance Due   Topic Date Due    Asthma Action Plan  Never done    Asthma Control Test  Never done    Pneumococcal Vaccine: Birth to 64yrs (1 of 2 - PCV) Never done    COVID-19 Vaccine (5 - 2023-24 season) 09/01/2023    Pap Smear  12/16/2024       Patient/Caregiver Education: Patient/Caregiver Education: There are no barriers to learning. Medical education done.   Outcome: Patient verbalizes understanding. Patient is notified to call with any questions, complications, allergies, or worsening or changing symptoms.  Patient is to call with any side effects or complications from the treatments as a result of today.     Problem List:  Patient Active Problem List   Diagnosis    Peanut allergy    Tree nut allergy    Eosinophilic esophagitis    Soy allergy         No follow-ups on file.     Foster Castellanos MD    Please note that portions of this note may have been completed with a voice recognition program. Efforts were made to edit the dictations but occasionally words are mis-transcribed.

## 2024-12-12 ENCOUNTER — OFFICE VISIT (OUTPATIENT)
Dept: RHEUMATOLOGY | Facility: CLINIC | Age: 29
End: 2024-12-12
Payer: COMMERCIAL

## 2024-12-12 VITALS
WEIGHT: 161 LBS | RESPIRATION RATE: 16 BRPM | HEART RATE: 65 BPM | SYSTOLIC BLOOD PRESSURE: 108 MMHG | TEMPERATURE: 98 F | DIASTOLIC BLOOD PRESSURE: 74 MMHG | BODY MASS INDEX: 24.4 KG/M2 | HEIGHT: 68 IN | OXYGEN SATURATION: 97 %

## 2024-12-12 DIAGNOSIS — J45.20 MILD INTERMITTENT ASTHMA WITHOUT COMPLICATION (HCC): ICD-10-CM

## 2024-12-12 DIAGNOSIS — M25.561 CHRONIC PAIN OF BOTH KNEES: ICD-10-CM

## 2024-12-12 DIAGNOSIS — L20.84 INTRINSIC ECZEMA: ICD-10-CM

## 2024-12-12 DIAGNOSIS — M25.562 CHRONIC PAIN OF BOTH KNEES: ICD-10-CM

## 2024-12-12 DIAGNOSIS — F33.1 MODERATE EPISODE OF RECURRENT MAJOR DEPRESSIVE DISORDER (HCC): ICD-10-CM

## 2024-12-12 DIAGNOSIS — K20.0 EOSINOPHILIC ESOPHAGITIS: Primary | ICD-10-CM

## 2024-12-12 DIAGNOSIS — G89.29 CHRONIC PAIN OF BOTH KNEES: ICD-10-CM

## 2024-12-12 DIAGNOSIS — M79.10 MYALGIA: ICD-10-CM

## 2024-12-12 DIAGNOSIS — M79.605 PAIN IN BOTH LOWER EXTREMITIES: ICD-10-CM

## 2024-12-12 DIAGNOSIS — M79.604 PAIN IN BOTH LOWER EXTREMITIES: ICD-10-CM

## 2024-12-12 PROBLEM — B37.81 ESOPHAGEAL THRUSH (HCC): Status: ACTIVE | Noted: 2024-12-12

## 2024-12-12 PROCEDURE — 99204 OFFICE O/P NEW MOD 45 MIN: CPT | Performed by: INTERNAL MEDICINE

## 2024-12-12 RX ORDER — ALBUTEROL SULFATE 90 UG/1
1-2 INHALANT RESPIRATORY (INHALATION) EVERY 4 HOURS PRN
COMMUNITY
Start: 2024-12-02

## 2024-12-12 RX ORDER — FEXOFENADINE HCL 180 MG/1
180 TABLET ORAL DAILY
COMMUNITY

## 2024-12-12 RX ORDER — FLUCONAZOLE 200 MG/1
TABLET ORAL
COMMUNITY
Start: 2024-12-12 | End: 2024-12-26

## 2024-12-12 RX ORDER — TRIAMCINOLONE ACETONIDE 1 MG/G
OINTMENT TOPICAL
COMMUNITY
Start: 2024-10-11

## 2024-12-12 RX ORDER — BUDESONIDE 1 MG/2ML
INHALANT ORAL
COMMUNITY
Start: 2024-09-10

## 2024-12-12 NOTE — PATIENT INSTRUCTIONS
To rule out autoimmune diseases a series of labs were ordered including tests for Lupus, RA, muscle disease, Lyme Disease,  And inflammation.  In my opinion, you have an \"allergic\" immune system, which leads to food allergies, asthma, eosinophilic esophagitis.  Your \"overactive\" immune system could be triggering low level inflammation in your muscles and joints which irritates you.  Hopefully you do not have a full blown autoimmune disease.  I will text you with your lab results.  Feel free to call me with any questions. For aches an pains take ES Tylenol 500 mg as needed.  If ES Tylenol is not helpful try Aleve or Advil.  Return to office for recheck if you have a flare up.   Sincerely,  Dr. Babcock.

## 2024-12-12 NOTE — PROGRESS NOTES
[facility]  Report of Consultation    Maria M Rawls Patient Status:  No patient class for patient encounter    1995 MRN [id number]   Location [facility], [facility] PCP Foster Castellanos MD     Date of Consult:  24     Reason for Consultation:   Chief Complaint   Patient presents with    New Patient     Pt is new to the office.   Rapid 3 score: 3.0    Pt referred by self. Pt has been having pain in her knee and elbow joints over the past year. Pt notes this is typically worse after exertion. Pt states she feels fatigued and experiences brain fog often.        History of Present Illness: Maria M Rawls is a a(n) 29 year old female.   C/o trouble since last September. .    Has fatigue.  Muscle pain - legs.  Has chest irritation at times.  Joint pain knees and elbows, ankles too.  Joint swelling no.  Has a history of sensitive skin and eczema.   Occasional itchy skin with hives or dots.   Gets tingling in her legs and feet.  Right arm had tingling - had thoracic outlet testing - [facility] ok.         History:  PMH:       Eczema                  Eosinophilic esophagitis - goes to [facility].   Depression- since 2019  on meds for a couple of mouths.    PSH:       Gastroscopes x 8 - one last week, thrush noted last week - now on fluconazole   FAMHX:   Mother Shannon Rawls.   SOCHX:   Works for a [employer].  Lives with her boyfriend.  Lives in [place].  Non smoker.  Occasional drink - 4/week on average.     Allergies: Allergies[1] Peanut and Soy     Medications:   Current Outpatient Medications:     albuterol 108 (90 Base) MCG/ACT Inhalation Aero Soln, Inhale 1-2 puffs into the lungs every 4 (four) hours as needed., Disp: , Rfl:     Budesonide 1 MG/2ML Inhalation Suspension, Take 2ml (1 vial) by mouth as directed by your Doctor once daily at Night. Mix with 3-4 packets of splenda or honey per your Doctor's instructions., Disp: , Rfl:     fluconazole 200 MG Oral Tab,  Take by mouth., Disp: , Rfl:     triamcinolone 0.1 % External Ointment, Can use twice a day up to a week at a time to affected areas as needed, Disp: , Rfl:     fexofenadine 180 MG Oral Tab, Take 1 tablet (180 mg total) by mouth daily., Disp: , Rfl:     sertraline 50 MG Oral Tab, Take 1 tablet (50 mg total) by mouth daily., Disp: , Rfl:     Alum Hydroxide-Mag Trisilicate (GAVISCON) 80-14.2 MG Oral Chew Tab, Chew 1 tablet by mouth as needed., Disp: , Rfl:     azelastine 0.1 % Nasal Solution, 2 sprays by Nasal route 2 (two) times daily., Disp: , Rfl:     Budesonide-Formoterol Fumarate 80-4.5 MCG/ACT Inhalation Aerosol, Inhale 2 puffs into the lungs 2 (two) times daily., Disp: , Rfl:     famotidine (PEPCID) 20 MG Oral Tab, , Disp: , Rfl:     Omeprazole 40 MG Oral Capsule Delayed Release, Take 1 capsule (40 mg total) by mouth daily., Disp: , Rfl:     EPINEPHrine 0.3 MG/0.3ML Injection Solution Auto-injector, , Disp: , Rfl:     clobetasol 0.05 % External Cream, Apply thin layer once daily prn. Max 2 weeks., Disp: 30 g, Rfl: 1    Loratadine (ALAVERT OR), Take  by mouth. (Patient not taking: Reported on 12/12/2024), Disp: , Rfl:     Review of Systems:   No recent fever or chills. No recent weight loss or weight gain.  Occasional chest pain and  shortness of breath.   On inhalers for asthma. No  palpitations.  At times  abdominal pain, upper.  Has had US pancreas ok. Infrequent  nausea.  Occasional bloating and diarrhea. No vomiting.  History of UTIs.   No difficulty with urination. No blood in the stool or blood in urine.  No current difficulty with vision or hearing.  Joint pain/joint swelling-see HPI. No muscle pain. No leg swelling.  Sensitive skin - eczema. .    Physical Exam:/74   Pulse 65   Temp 97.9 °F (36.6 °C)   Resp 16   Ht 5' 8\" (1.727 m)   Wt 161 lb (73 kg)   LMP 06/08/2024 (Exact Date)   SpO2 97%   BMI 24.48 kg/m²    Slender woman in NAD    HEENT exam within normal limits. Nonicteric sclera.  Conjunctiva normal.    Neck supple without thyromegaly, no lymphadenopathy.   Lungs are clear to auscultation and percussion.    Heart: normal sinus rhythm without murmur.    Abdomen: soft, nontender, no masses, no organomegaly.    Extremities without any cyanosis, clubbing, or edema.  Normal upper and lower extremities.  Skin: Within normal limits.    Laboratory Data: 7/3/24 WBC 5.7 hemoglobin 12.3 hematocrit 38.5 platelet count 251,000.  TSH 4.03 Free T41.0 Alkaline phos 41 total protein 6.8 albumin 4.3  Vitamin B12 422 globulin 2.5 AST 17 ALT 12 bilirubin 0.6  Glucose 88 sodium 140 potassium 4.1 chloride 106 BUN 11 creatinine 0.8 calcium 9.1      Imagin2023 x-ray of right wrist reveals a stable fracture deformity of the distal radius.  Stable mildly displaced ulnar styloid fracture.    Path Report [facility] 24  Esophagus  candida present     Impression: 29 yr old woman with aches and pains in muscles and joints that are intermittent.  Possibly fibromyalgia, possibly polyarthralgia of an inflammatory autoimmune origin.     1. Eosinophilic esophagitis    2. Chronic pain of both knees    3. Pain in both lower extremities    4. Intrinsic eczema    5. Moderate episode of recurrent major depressive disorder (HCC)    6. Mild intermittent asthma without complication (HCC)    7. Myalgia        Recommendations:   Patient Instructions   To rule out autoimmune diseases a series of labs were ordered including tests for Lupus, RA, muscle disease, Lyme Disease,  And inflammation.  In my opinion, you have an \"allergic\" immune system, which leads to food allergies, asthma, eosinophilic esophagitis.  Your \"overactive\" immune system could be triggering low level inflammation in your muscles and joints which irritates you.  Hopefully you do not have a full blown autoimmune disease.  I will text you with your lab results.  Feel free to call me with any questions. For aches an pains take ES Tylenol 500 mg as  needed.  If ES Tylenol is not helpful try Aleve or Advil.  Return to office for recheck if you have a flare up.   Sincerely,  Dr. Babcock.      Thank you for allowing me to participate in the care of your patient.    Alfredo Babcock MD  12/12/2024  11:46 AM         [1]   Allergies  Allergen Reactions    Other ANAPHYLAXIS     Soy products    Peanuts ANAPHYLAXIS    Soy Allergy ANAPHYLAXIS    Dairy Products OTHER (SEE COMMENTS)     Esophageus reaction    Tree Nuts ANAPHYLAXIS

## 2024-12-23 LAB
ALBUMIN/GLOBULIN RATIO: 1.7 (CALC) (ref 1–2.5)
ALBUMIN: 4.5 G/DL (ref 3.6–5.1)
ALKALINE PHOSPHATASE: 60 U/L (ref 31–125)
ALT: 13 U/L (ref 6–29)
ANA SCREEN, IFA: NEGATIVE
AST: 15 U/L (ref 10–30)
B2 GLYCOPROTEIN I (IGA)AB: <2 U/ML
B2 GLYCOPROTEIN I (IGG)AB: <2 U/ML
B2 GLYCOPROTEIN I(IGM)AB: <2 U/ML
BILIRUBIN, TOTAL: 0.3 MG/DL (ref 0.2–1.2)
BUN: 18 MG/DL (ref 7–25)
C-REACTIVE PROTEIN: <3 MG/L
CALCIUM: 9.3 MG/DL (ref 8.6–10.2)
CARBON DIOXIDE: 23 MMOL/L (ref 20–32)
CARDIOLIPIN AB (IGA): <2 APL-U/ML
CARDIOLIPIN AB (IGG): <2 GPL-U/ML
CARDIOLIPIN AB (IGM): <2 MPL-U/ML
CHLORIDE: 108 MMOL/L (ref 98–110)
COMPLEMENT COMPONENT C3C: 118 MG/DL (ref 83–193)
COMPLEMENT COMPONENT C4C: 20 MG/DL (ref 15–57)
CREATINE KINASE, TOTAL: 65 U/L (ref 29–143)
CREATININE: 0.84 MG/DL (ref 0.5–0.96)
CYCLIC CITRULLINATED$PEPTIDE (CCP) AB (IGG): 27 UNITS
DNA AB (DS) CRITHIDIA,IFA: NEGATIVE
EGFR: 98 ML/MIN/1.73M2
EJ AB: <11 SI
GLOBULIN: 2.7 G/DL (CALC) (ref 1.9–3.7)
GLUCOSE: 88 MG/DL (ref 65–99)
JO-1 AB: <11 SI
MDA-5 AB: <11 SI
MI-2 ALPHA AB: <11 SI
MI-2 BETA AB: <11 SI
MUTATED CITRULLINATED VIMENTIN (MCV) AB: <20 U/ML
NXP-2 AB: <11 SI
OJ AB: <11 SI
PL-12 AB: <11 SI
PL-7 AB: <11 SI
POTASSIUM: 4.6 MMOL/L (ref 3.5–5.3)
PROTEIN, TOTAL: 7.2 G/DL (ref 6.1–8.1)
RHEUMATOID FACTOR (IGA): <5 U
RHEUMATOID FACTOR (IGG): <5 U
RHEUMATOID FACTOR (IGM): <5 U
RHEUMATOID FACTOR: <10 IU/ML
SED RATE BY MODIFIED$WESTERGREN: 6 MM/H
SODIUM: 141 MMOL/L (ref 135–146)
SRP AB: <11 SI
THYROID PEROXIDASE$ANTIBODIES: 1 IU/ML
TIF-1Y AB: <11 SI

## 2024-12-30 ENCOUNTER — TELEPHONE (OUTPATIENT)
Facility: CLINIC | Age: 29
End: 2024-12-30

## 2024-12-30 DIAGNOSIS — G89.29 CHRONIC PAIN OF BOTH KNEES: Primary | ICD-10-CM

## 2024-12-30 DIAGNOSIS — M79.10 MYALGIA: ICD-10-CM

## 2024-12-30 DIAGNOSIS — M25.561 CHRONIC PAIN OF BOTH KNEES: Primary | ICD-10-CM

## 2024-12-30 DIAGNOSIS — M25.562 CHRONIC PAIN OF BOTH KNEES: Primary | ICD-10-CM

## 2024-12-30 NOTE — TELEPHONE ENCOUNTER
Maria M called lab results discussed,  Low level CCP  antibody of 27 units a weak positive.  Strong positive is > 59.  Recheck CCP AB, ESR, CRP, RF in  3 months.  Still has fatigue, myalgia,shoulder pain at times.  Post viral?  Dr. Babcock

## 2025-02-11 LAB
LYME DISEASE AB (IGG) WB: NEGATIVE
LYME DISEASE AB (IGM) WB: NEGATIVE

## 2025-03-06 ENCOUNTER — PATIENT MESSAGE (OUTPATIENT)
Dept: RHEUMATOLOGY | Facility: CLINIC | Age: 30
End: 2025-03-06

## 2025-03-06 DIAGNOSIS — M25.561 CHRONIC PAIN OF BOTH KNEES: ICD-10-CM

## 2025-03-06 DIAGNOSIS — M25.562 CHRONIC PAIN OF BOTH KNEES: ICD-10-CM

## 2025-03-06 DIAGNOSIS — G89.29 CHRONIC PAIN OF BOTH KNEES: ICD-10-CM

## 2025-03-06 DIAGNOSIS — M79.10 MYALGIA: Primary | ICD-10-CM

## 2025-03-08 LAB
ANA SCREEN, IFA: NEGATIVE
C-REACTIVE PROTEIN: <3 MG/L
COMPLEMENT COMPONENT C3C: 126 MG/DL (ref 83–193)
COMPLEMENT COMPONENT C4C: 19 MG/DL (ref 15–57)
COMPLEMENT, TOTAL (CH50): 55 U/ML (ref 31–60)
CYCLIC CITRULLINATED$PEPTIDE (CCP) AB (IGG): 33 UNITS
DNA (DS) ANTIBODY: <1 IU/ML
RHEUMATOID FACTOR: <10 IU/ML
SED RATE BY MODIFIED$WESTERGREN: 6 MM/H

## (undated) NOTE — MR AVS SNAPSHOT
[facility]  [address] [address] [phone]               Thank you for choosing us for your health care visit with GINA Murphy.   We are glad to serve you and happy to provide you with this summary of your OME can happen when you have a cold if congestion blocks the passage that drains the middle ear. This passage is called the eustachian tube. OME may also occur with nasal allergies or after a bacterial middle ear infection.     The pain or discomfort may co · Antihistamines may help if you are also having allergy symptoms. · You may use medicines such as guaifenesin to thin mucus and promote drainage.   Follow-up care  Follow up with your healthcare provider or as advised if you are not feeling better after 3 These medications were sent to [facility], [address] [place] [address] [address], [facility], [address]     Phone:  [phone]    - azithromycin 250 MG Tabs  - Fluticasone Propionate 50 MCG/ACT Susp            M